# Patient Record
Sex: FEMALE | Race: WHITE | Employment: OTHER | URBAN - METROPOLITAN AREA
[De-identification: names, ages, dates, MRNs, and addresses within clinical notes are randomized per-mention and may not be internally consistent; named-entity substitution may affect disease eponyms.]

---

## 2017-05-01 RX ORDER — CLOBETASOL PROPIONATE 0.5 MG/G
CREAM TOPICAL
Qty: 45 G | Refills: 2 | Status: SHIPPED | OUTPATIENT
Start: 2017-05-01 | End: 2019-08-21 | Stop reason: SDUPTHER

## 2017-05-02 ENCOUNTER — TELEPHONE (OUTPATIENT)
Dept: DERMATOLOGY | Age: 67
End: 2017-05-02

## 2017-05-02 RX ORDER — TRIAMCINOLONE ACETONIDE 1 MG/G
CREAM TOPICAL
Qty: 80 G | Refills: 2 | Status: SHIPPED | OUTPATIENT
Start: 2017-05-02 | End: 2018-02-01 | Stop reason: SDUPTHER

## 2018-02-01 ENCOUNTER — OFFICE VISIT (OUTPATIENT)
Dept: DERMATOLOGY | Age: 68
End: 2018-02-01

## 2018-02-01 DIAGNOSIS — L57.0 AK (ACTINIC KERATOSIS): ICD-10-CM

## 2018-02-01 DIAGNOSIS — L20.84 INTRINSIC ATOPIC DERMATITIS: Primary | ICD-10-CM

## 2018-02-01 PROCEDURE — 17000 DESTRUCT PREMALG LESION: CPT | Performed by: DERMATOLOGY

## 2018-02-01 PROCEDURE — G8427 DOCREV CUR MEDS BY ELIG CLIN: HCPCS | Performed by: DERMATOLOGY

## 2018-02-01 PROCEDURE — 99213 OFFICE O/P EST LOW 20 MIN: CPT | Performed by: DERMATOLOGY

## 2018-02-01 PROCEDURE — G8421 BMI NOT CALCULATED: HCPCS | Performed by: DERMATOLOGY

## 2018-02-01 PROCEDURE — 4040F PNEUMOC VAC/ADMIN/RCVD: CPT | Performed by: DERMATOLOGY

## 2018-02-01 PROCEDURE — G8400 PT W/DXA NO RESULTS DOC: HCPCS | Performed by: DERMATOLOGY

## 2018-02-01 PROCEDURE — 3014F SCREEN MAMMO DOC REV: CPT | Performed by: DERMATOLOGY

## 2018-02-01 PROCEDURE — 1090F PRES/ABSN URINE INCON ASSESS: CPT | Performed by: DERMATOLOGY

## 2018-02-01 PROCEDURE — 96372 THER/PROPH/DIAG INJ SC/IM: CPT | Performed by: DERMATOLOGY

## 2018-02-01 PROCEDURE — 1036F TOBACCO NON-USER: CPT | Performed by: DERMATOLOGY

## 2018-02-01 PROCEDURE — G8484 FLU IMMUNIZE NO ADMIN: HCPCS | Performed by: DERMATOLOGY

## 2018-02-01 PROCEDURE — 1123F ACP DISCUSS/DSCN MKR DOCD: CPT | Performed by: DERMATOLOGY

## 2018-02-01 PROCEDURE — 3017F COLORECTAL CA SCREEN DOC REV: CPT | Performed by: DERMATOLOGY

## 2018-02-01 RX ORDER — TRIAMCINOLONE ACETONIDE 1 MG/G
CREAM TOPICAL
Qty: 454 G | Refills: 1 | Status: SHIPPED | OUTPATIENT
Start: 2018-02-01 | End: 2019-08-21 | Stop reason: SDUPTHER

## 2018-02-01 RX ORDER — TRIAMCINOLONE ACETONIDE 40 MG/ML
60 INJECTION, SUSPENSION INTRA-ARTICULAR; INTRAMUSCULAR ONCE
Status: COMPLETED | OUTPATIENT
Start: 2018-02-01 | End: 2018-02-01

## 2018-02-01 RX ADMIN — TRIAMCINOLONE ACETONIDE 60 MG: 40 INJECTION, SUSPENSION INTRA-ARTICULAR; INTRAMUSCULAR at 14:20

## 2018-02-01 NOTE — PROGRESS NOTES
risks including insomnia, mood disturbance, increased appetite. Triamcinolone 0.1% cream twice daily for up to 2 weeks or until improved. Check home for bed bugs. 2. AK (actinic keratosis) - 1    2 cycles of liquid nitrogen applied to 1 AK on the nose. Patient was educated regarding the potential risks of blister formation, discomfort, hypopigmentation, and scar. Wound care was discussed. Return in about 1 year (around 2/1/2019).

## 2019-02-04 ENCOUNTER — OFFICE VISIT (OUTPATIENT)
Dept: DERMATOLOGY | Age: 69
End: 2019-02-04
Payer: MEDICARE

## 2019-02-04 DIAGNOSIS — L20.84 INTRINSIC ATOPIC DERMATITIS: Primary | ICD-10-CM

## 2019-02-04 DIAGNOSIS — L82.1 SK (SEBORRHEIC KERATOSIS): ICD-10-CM

## 2019-02-04 DIAGNOSIS — L57.0 AK (ACTINIC KERATOSIS): ICD-10-CM

## 2019-02-04 PROCEDURE — 4040F PNEUMOC VAC/ADMIN/RCVD: CPT | Performed by: DERMATOLOGY

## 2019-02-04 PROCEDURE — 1101F PT FALLS ASSESS-DOCD LE1/YR: CPT | Performed by: DERMATOLOGY

## 2019-02-04 PROCEDURE — 17000 DESTRUCT PREMALG LESION: CPT | Performed by: DERMATOLOGY

## 2019-02-04 PROCEDURE — 1036F TOBACCO NON-USER: CPT | Performed by: DERMATOLOGY

## 2019-02-04 PROCEDURE — 99213 OFFICE O/P EST LOW 20 MIN: CPT | Performed by: DERMATOLOGY

## 2019-02-04 PROCEDURE — G8421 BMI NOT CALCULATED: HCPCS | Performed by: DERMATOLOGY

## 2019-02-04 PROCEDURE — 1090F PRES/ABSN URINE INCON ASSESS: CPT | Performed by: DERMATOLOGY

## 2019-02-04 PROCEDURE — 1123F ACP DISCUSS/DSCN MKR DOCD: CPT | Performed by: DERMATOLOGY

## 2019-02-04 PROCEDURE — G8400 PT W/DXA NO RESULTS DOC: HCPCS | Performed by: DERMATOLOGY

## 2019-02-04 PROCEDURE — 3017F COLORECTAL CA SCREEN DOC REV: CPT | Performed by: DERMATOLOGY

## 2019-02-04 PROCEDURE — G8484 FLU IMMUNIZE NO ADMIN: HCPCS | Performed by: DERMATOLOGY

## 2019-02-04 PROCEDURE — 17003 DESTRUCT PREMALG LES 2-14: CPT | Performed by: DERMATOLOGY

## 2019-02-04 PROCEDURE — G8427 DOCREV CUR MEDS BY ELIG CLIN: HCPCS | Performed by: DERMATOLOGY

## 2019-08-21 ENCOUNTER — OFFICE VISIT (OUTPATIENT)
Dept: DERMATOLOGY | Age: 69
End: 2019-08-21
Payer: MEDICARE

## 2019-08-21 DIAGNOSIS — L82.0 INFLAMED SEBORRHEIC KERATOSIS: Primary | ICD-10-CM

## 2019-08-21 PROCEDURE — 11301 SHAVE SKIN LESION 0.6-1.0 CM: CPT | Performed by: DERMATOLOGY

## 2019-08-21 RX ORDER — CLOBETASOL PROPIONATE 0.5 MG/G
CREAM TOPICAL
Qty: 60 G | Refills: 2 | Status: SHIPPED | OUTPATIENT
Start: 2019-08-21

## 2019-08-21 RX ORDER — TRIAMCINOLONE ACETONIDE 1 MG/G
CREAM TOPICAL
Qty: 454 G | Refills: 1 | Status: SHIPPED | OUTPATIENT
Start: 2019-08-21 | End: 2022-10-31 | Stop reason: SDUPTHER

## 2019-08-21 NOTE — PROGRESS NOTES
care instructions were reviewed. 1 Specimen (s) sent to pathology. The specimen bottle(s) were appropriately labeled. We also reviewed the risks of bleeding, scar, and infection.

## 2019-08-21 NOTE — PATIENT INSTRUCTIONS

## 2019-08-23 LAB — DERMATOLOGY PATHOLOGY REPORT: NORMAL

## 2020-01-21 ENCOUNTER — HOSPITAL ENCOUNTER (OUTPATIENT)
Dept: MAMMOGRAPHY | Age: 70
Discharge: HOME | End: 2020-01-21
Payer: MEDICARE

## 2020-01-21 PROCEDURE — 77067 SCR MAMMO BI INCL CAD: CPT

## 2020-06-03 ENCOUNTER — OFFICE VISIT (OUTPATIENT)
Dept: DERMATOLOGY | Age: 70
End: 2020-06-03
Payer: MEDICARE

## 2020-06-03 VITALS — TEMPERATURE: 98.5 F

## 2020-06-03 PROCEDURE — 99213 OFFICE O/P EST LOW 20 MIN: CPT | Performed by: DERMATOLOGY

## 2020-06-03 NOTE — PROGRESS NOTES
dermatitis - very mild/limited today, under good control    Continue intermittent use of triamcinolone 0.1% cream or clobetasol cream for flares. 2. SK (seborrheic keratosis)     Reassurance. 3. Adult acne - very mild    Differin 0.1% gel nightly. 4. Actinic keratosis - 1 small asymptomatic lesion    Observe. Continue sun protection. Return in about 1 year (around 6/3/2021).

## 2020-09-23 ENCOUNTER — OFFICE VISIT (OUTPATIENT)
Dept: SURGERY | Age: 70
End: 2020-09-23
Payer: MEDICARE

## 2020-09-23 VITALS
RESPIRATION RATE: 16 BRPM | OXYGEN SATURATION: 99 % | WEIGHT: 122.8 LBS | HEIGHT: 69 IN | BODY MASS INDEX: 18.19 KG/M2 | HEART RATE: 69 BPM | DIASTOLIC BLOOD PRESSURE: 72 MMHG | TEMPERATURE: 97.4 F | SYSTOLIC BLOOD PRESSURE: 110 MMHG

## 2020-09-23 PROCEDURE — 99204 OFFICE O/P NEW MOD 45 MIN: CPT | Performed by: SURGERY

## 2020-09-23 NOTE — PROGRESS NOTES
[facility]    CC:   Chief Complaint   Patient presents with    New Patient     new patient, ruptured breast implants     REFERRING PHYSICIAN: Katie Laguerre MD    HPI: This is a 77 y.o.  female who presents to clinic with ruptured bilateral breast implants. She has had them placed for over 30 years (saline implants but unsure). She underwent mammography with some concern for leakage and then underwent an MRI confirming implant rupture. She was referred for explantation of her implants. Her pertinent breast history include the following:    Last Mammogram:     Current bra size: 34B  Desired bra size: Ok being smaller. Pregnancies/miscarriages:   Breast feeding: no future plans    PMHx:   Past Medical History:   Diagnosis Date    History of hepatitis C      PSHx:   Past Surgical History:   Procedure Laterality Date    JOINT REPLACEMENT      both hips, last in 2020.  KNEE SURGERY       ALLERGIES:   Allergies   Allergen Reactions    Sulfa Antibiotics Rash     SOCIAL: No tobacco, occ ETOH, no IVD  FHx: Past history of breast CA: No   Past family members with breast reduction: No   Past family members with breast augmentation:No    Meds:   Current Outpatient Medications   Medication Sig Dispense Refill    triamcinolone (KENALOG) 0.1 % cream Apply to affected areas twice daily for up to 2 weeks or until improved for dermatitis. 454 g 1    clobetasol (TEMOVATE) 0.05 % cream APPLY TWO TIMES A DAY for 2 weeks or until improved. For dermatitis. 60 g 2    BIOTIN PO Take by mouth      Cranberry 1000 MG CAPS Take by mouth      diclofenac (VOLTAREN) 50 MG EC tablet Take 50 mg by mouth      Hyaluronic Acid-Vitamin C (HYALURONIC ACID PO) Take by mouth       No current facility-administered medications for this visit. ROS   Constitutional: Negative for chills and fever. HENT: Negative for congestion, facial swelling, and voice change.     Eyes: Negative for breast implants  PLAN:Would benefit from explantation of ruptured implants with complete capsulectomies. Additionally, would benefit from mastopexy for improved contour (though this will be cosmetic). Pricing to be provided to the patient and then will work to schedule (anticipate in November). A discussion regarding surgical options including: removal of implants, capsulectomy, and mastopexy was performed with the patient. Clinical photos were obtained. Additionally,discussion regarding the risks including, but not limited to: bleeding (potentially requiring transfusion or reoperation), infection, seroma, reoperation, poor cosmetic outcome, scarring, revisional surgery, nipple loss/complication, nipple malposition, diminished sensation, skin necrosis, inability to breastfeed, VTE (DVT/PE), implant associated ALCL, and death was performed. All questions were answered in a satisfactory manner. The patient was counseled at length about the risks of keshia Covid-19 during their perioperative period and any recovery window from their procedure. The patient was made aware that keshia Covid-19  may worsen their prognosis for recovering from their procedure  and lend to a higher morbidity and/or mortality risk. All material risks, benefits, and reasonable alternatives including postponing the procedure were discussed. The patient does wish to proceed with the procedure at this time.     Ziggy Tran MD  [facility]  09/23/20

## 2020-09-25 ENCOUNTER — TELEPHONE (OUTPATIENT)
Dept: SURGERY | Age: 70
End: 2020-09-25

## 2020-09-25 NOTE — TELEPHONE ENCOUNTER
[facility]    She needs explantation of her ruptured implants as well as complete capsulectomies. If she would like to have bilateral mastopexy at the same time, we can do that. However, the mastopexy surgery would be cosmetic. Thanks!   NK

## 2020-09-28 NOTE — TELEPHONE ENCOUNTER
Reviewed the results with patient. She did have this approved before by another plastic surgeon but, chose to go with us. She would like mastopexy pricing once we have worked on getting the approval from insurance on the ruptured implants. Please send us a surgery letter then we will work on getting this approved.

## 2020-10-02 NOTE — TELEPHONE ENCOUNTER
I spoke with Stef Almodovar at [facility] ([phone]) to see if CPT Codes 68593 and 67835 need a pre cert. She stated that no pre certification is required since it is outpatient. Call Reference # [id number]. The patient will also need cosmetic pricing when calling to schedule surgery.

## 2020-10-05 NOTE — TELEPHONE ENCOUNTER
I spoke with patient to advise that no pre cert is required for CPT Codes 52150 and 17677. I provided cosmetic pricing for the mastopexy. I will scan the pricing into Epic under the media tab. The patient thinks that she will wait to have the mastopexy done. The patient said that she and Dr. Nora Prescott discussed liposuction and would like pricing for this as well. I will work on that and call the patient back once I have it.

## 2020-10-19 ENCOUNTER — TELEPHONE (OUTPATIENT)
Dept: SURGERY | Age: 70
End: 2020-10-19

## 2020-10-27 NOTE — TELEPHONE ENCOUNTER
I lmom for patient at the home number listed. I requested a call back to discuss liposuction pricing and how to move forward. I will leave this phone note open.

## 2020-10-27 NOTE — TELEPHONE ENCOUNTER
The patient returned the call mentioned below. The patient is considering liposuction or nothing in regards to mastopexy. The patient would like to discuss her surgical options to make sure that she is clear on what her options are. Are you able to call her? Or would you prefer that she come back into the office? I may need a new surgery letter.      I will route to the MD

## 2020-10-29 NOTE — TELEPHONE ENCOUNTER
[facility]    Liposuction would not be an option for this patient. Lets have her come into the office so I can have another discussion with her to ensure that there is not any confusion.     Thanks,  NK

## 2020-11-04 ENCOUNTER — OFFICE VISIT (OUTPATIENT)
Dept: SURGERY | Age: 70
End: 2020-11-04
Payer: MEDICARE

## 2020-11-04 VITALS
TEMPERATURE: 97.3 F | BODY MASS INDEX: 18.6 KG/M2 | WEIGHT: 125.6 LBS | HEIGHT: 69 IN | HEART RATE: 58 BPM | DIASTOLIC BLOOD PRESSURE: 76 MMHG | SYSTOLIC BLOOD PRESSURE: 120 MMHG

## 2020-11-04 PROCEDURE — 99213 OFFICE O/P EST LOW 20 MIN: CPT | Performed by: SURGERY

## 2020-11-04 NOTE — PROGRESS NOTES
[facility]    CC:   No chief complaint on file. REFERRING PHYSICIAN: Hali Laguerre MD    HPI: This is a 72 y.o.  female who presents to clinic with ruptured bilateral breast implants. She has had them placed for over 30 years (saline implants but unsure). She underwent mammography with some concern for leakage and then underwent an MRI confirming implant rupture. She was referred for explantation of her implants. She has additional questions regarding surgery and presents to have all of these answered. Her pertinent breast history include the following:    Last Mammogram:     Current bra size: 34B  Desired bra size: Ok being smaller. Pregnancies/miscarriages:   Breast feeding: no future plans    PMHx:   Past Medical History:   Diagnosis Date    History of hepatitis C      PSHx:   Past Surgical History:   Procedure Laterality Date    JOINT REPLACEMENT      both hips, last in 2020.  KNEE SURGERY       ALLERGIES:   Allergies   Allergen Reactions    Sulfa Antibiotics Rash     SOCIAL: No tobacco, occ ETOH, no IVD  FHx: Past history of breast CA: No   Past family members with breast reduction: No   Past family members with breast augmentation:No    Meds:   Current Outpatient Medications   Medication Sig Dispense Refill    triamcinolone (KENALOG) 0.1 % cream Apply to affected areas twice daily for up to 2 weeks or until improved for dermatitis. 454 g 1    clobetasol (TEMOVATE) 0.05 % cream APPLY TWO TIMES A DAY for 2 weeks or until improved. For dermatitis. 60 g 2    BIOTIN PO Take by mouth      Cranberry 1000 MG CAPS Take by mouth      diclofenac (VOLTAREN) 50 MG EC tablet Take 50 mg by mouth      Hyaluronic Acid-Vitamin C (HYALURONIC ACID PO) Take by mouth       No current facility-administered medications for this visit. ROS   Constitutional: Negative for chills and fever.    HENT: Negative for congestion, facial swelling, and voice change. Eyes: Negative for photophobia and visual disturbance. Respiratory: Negative for apnea, cough, chest tightness and shortness of breath. Cardiovascular: Negative for chest pain and palpitations. Gastrointestinal: Negative for dysphagia and early satiety. Genitourinary: Negative for difficulty urinating, dysuria, flank pain, frequency and hematuria. Musculoskeletal: Negative for new gait problem, joint swelling and myalgias. Skin: Negative for color change, pallor and rash. Endocrine: negative for tremors, temperature intolerance or polydipsia. Allergic/Immunologic: Negative for new environmental or food allergies. Neurological: Negative for dizziness, seizures, speech difficulty, numbness. Hematological: Negative for adenopathy. Psychiatric/Behavioral: Negative for agitation and confusion. EXAM     There were no vitals taken for this visit.     GEN: NAD, pleasant, healthy  CVS: RRR  PULM: No respiratory distress  HEENT: PERRLA/EOMI; dentition (wearing mask), hearing appears within normal limits  NECK: Supple with trachea in midline, no masses  EXT: No lymphedema noted  ABD: soft/NT/ND   NEURO: No focal deficits, no obvious CN deficits  BACK: Bilateral latiss muscle intact  BREAST: Left larger than Right   R  Ptosis grade: Pseudoptosis     Palpable masses: Yes (baker class 3)     Nipple retraction: No     Palpable axillary lymphadenopathy: No     SN-N: 20.5 cm     N-IMF: 7 cm     Breast width: 13.1 cm     No animation deformity     Waterfall deformity      L  Ptosis stgstrstastdstest:st st1st Palpable masses: Yes (Baker class 3)     Nipple retraction: No     Palpable axillary lymphadenopathy: No     SN-N: 22.5 cm     N-IMF: 7.3 cm     Breast width: 13.2 cm     No animation deformity     Waterfall deformity    RADIOLOGY: Reviewed (including MRI at outside hospital revealing implant rupture)    IMP: 72 y.o. female with ruptured breast implants  PLAN:Would benefit from explantation of ruptured implants with complete capsulectomies. Additionally, would benefit from mastopexy for improved contour (though this will be cosmetic) - she does not want this. She is interested in fat grafting. Will obtain pricing and then provide to the patient. Anticipate surgery in 2021. A discussion regarding surgical options including: removal of implants, capsulectomy, and mastopexy was performed with the patient. Clinical photos were obtained. Additionally,discussion regarding the risks including, but not limited to: bleeding (potentially requiring transfusion or reoperation), infection, seroma, reoperation, poor cosmetic outcome, scarring, revisional surgery, nipple loss/complication, nipple malposition, diminished sensation, skin necrosis, inability to breastfeed, VTE (DVT/PE), implant associated ALCL, and death was performed. All questions were answered in a satisfactory manner. The patient was counseled at length about the risks of keshia Covid-19 during their perioperative period and any recovery window from their procedure. The patient was made aware that keshia Covid-19  may worsen their prognosis for recovering from their procedure  and lend to a higher morbidity and/or mortality risk. All material risks, benefits, and reasonable alternatives including postponing the procedure were discussed. The patient does wish to proceed with the procedure at this time.     Dorcas De Leon MD  [facility]  11/04/20

## 2020-11-12 ENCOUNTER — TELEPHONE (OUTPATIENT)
Dept: SURGERY | Age: 70
End: 2020-11-12

## 2020-11-12 NOTE — TELEPHONE ENCOUNTER
The patient was in the office to see Dr. Braden Hollis on 11-4-2020. IMP: 72 y.o. female with ruptured breast implants  PLAN:Would benefit from explantation of ruptured implants with complete capsulectomies. Additionally, would benefit from mastopexy for improved contour (though this will be cosmetic) - she does not want this. She is interested in fat grafting. Will obtain pricing and then provide to the patient. Anticipate surgery in 2021.     Please provide a surgery letter. I will then submit to insurance and call patient with cosmetic pricing.     I will route to MD.

## 2020-11-13 NOTE — TELEPHONE ENCOUNTER
I spoke with Lawyer Reyna at [facility] ([phone]) to see if CPT Code 71055 requires a pre authorization. Authorization Is Not Required   Call Reference # [id number]    Cosmetic Pricing   Fat Grafting To The Breasts $3850.00 The patient is aware that payment is due to in full 14 days prior to surgery    The following is a message from Dr Tamika Dutton listed on the surgery letter :    I need to talk to her about fat grafting before we do any scheduling. Her BMI is 18, so I'm not sure how much fat I will truly be able to get out of her. Thanks! NK       I spoke with the patient to discuss surgery and cosmetic pricing. She is aware that Dr. Tamika Dutton would yue to speak to her prior to scheduling. She would like Dr. Tamika Dutton to call her, so she can decide how to move forward. Please call the patient.      I will route to MD.

## 2020-11-15 NOTE — TELEPHONE ENCOUNTER
[facility]    Spoke with Ellen Whitten on the phone. She would like to proceed with removal of her implants now and would like to schedule. She will revisit future cosmetic procedures (eg mastopexy, secondary augmentation, fat grafting etc) after she has completely recovered. Thanks!   Ken Davis

## 2020-11-17 NOTE — TELEPHONE ENCOUNTER
I lmom for patient at the home number listed. I requested a call back to discuss scheduling surgery. I will leave this phone note open.

## 2020-11-19 NOTE — TELEPHONE ENCOUNTER
I returned the call mentioned below. The patient is now scheduled to have surgery with Dr. Marlene Roach 1-. She is not doing the cosmetic portion that was previously discussed, so I changed the surgery letter. The patient is aware of H&P and COVID. I will mail the surgery information and instructions to the patient. I will submit the surgery letter. The patient stated that she has had both replaced and would like to know if she needs a pre op antibiotic?     I will route this question to the MD.

## 2020-12-03 ENCOUNTER — TELEPHONE (OUTPATIENT)
Dept: SURGERY | Age: 70
End: 2020-12-03

## 2020-12-03 NOTE — TELEPHONE ENCOUNTER
Pt called. She was seen as a new pt on 09/23/2020 and her questions were not answered. She was scheduled for another appt on 11/04/2020 to answer her questions. Pt stated she asked if she would have to pay another copay and was told no since her questions were not answered at initial visit. She has now received a bill for a copay for 11/04/2020 visit. Please call.

## 2021-01-15 NOTE — PROGRESS NOTES
Patient has history of + covid test 2020 , done at [facility], I asked her for copy of results with her name, , and date of testing to be faxed/emailed to us.  email address given to patient and informed if we receive this no need for covid testing on 2021, patient acknowledges understanding.      Received covid + test results via email from patient done 2020 through [facility], per [facility], scanned into system, schedulers informed, no need to retest.

## 2021-01-15 NOTE — PROGRESS NOTES
Message left at office of Gerarda Klinefelter CNP for EKG tracing done 1/12/2021 to be  faxed to us/also left our office number.

## 2021-01-15 NOTE — PROGRESS NOTES
[facility] patients having surgery or anesthesia are required to be Covid tested. You will need to quarantined from the time you are tested until your surgery. PRIOR TO PROCEDURE DATE:  1. Please follow any guidelines/instructions prior to your procedure as advised by your surgeon. 2. Arrange for someone to drive you home and be with you for the first 24 hours after discharge for your safety after your procedure for which you received sedation. Ensure it is someone we can share information with regarding your discharge. 3. You must contact your surgeon for instructions IF:  ? You are taking any blood thinners, aspirin, anti-inflammatory or vitamin E.  ? There is a change in your physical condition such as a cold, fever, rash, cuts, sores or any other infection, especially near your surgical site. 4. Do not drink alcohol the day before or day of your procedure. 5. A Pre-op History and Physical for surgery MUST be completed by your Physician or Urgent Care within 30 days of your procedure date. Please bring a copy with you on the day of your procedure and along with any other testing performed. THE DAY OF YOUR PROCEDURE:  1. Follow instructions for ARRIVAL TIME as DIRECTED BY YOUR SURGEON. I    2. Enter the MAIN entrance from [address] [place] [address] and follow the signs to the free [employer] or [employer] parking (offered free of charge 6am-5pm). 3. Enter the Main Entrance of the hospital (do not enter from the lower level of the parking garage). Upon entrance, check in with the  at the main desk on your left. If no one is available at the desk, proceed into the [facility] Waiting Room and go through the door directly into the [facility]. There is a Check-in desk ACROSS from Room 5 (marked with a sign hanging from the ceiling). The phone number for the surgery center is [phone]. 4. Please call [phone] option #2 option #2 if you have not been preregistered yet. On the day of your procedure bring your insurance card and photo ID. You will be registered at your bedside once brought back to your room. 5. DO NOT EAT ANYTHING eight hours prior to surgery. May have 8 ounces of water 4 hours prior to surgery. 6. MEDICATIONS   ? Take the following medications with a SMALL sip of water: none  ? Use your usual dose of inhalers the morning of surgery. BRING your rescue inhaler with you to hospital.   ? Anesthesia does NOT want you to take insulin the morning of surgery. They will control your blood sugar while you are at the hospital. Please contact your ordering physician for instructions regarding your insulin the night before your procedure. If you have an insulin pump, please keep it set on basal rate. 7. Do not swallow water when brushing teeth. No gum, candy, mints or ice chips. Refrain from smoking or at least decrease the amount. 8. Dress in loose, comfortable clothing appropriate for redressing after your procedure. Do not wear jewelry (including body piercings), make-up (especially NO eye make-up), fingernail polish (NO toenail polish if foot/leg surgery), lotion, powders or metal hairclips. 9. Dentures, glasses, or contacts will need to be removed before your procedure. Bring cases for your glasses, contacts, dentures, or hearing aids to protect them while you are in surgery. 10. If you use a CPAP, please bring it with you on the day of your procedure. 11. We recommend that valuable personal  belongings such as cash, cell phones, e-tablets or jewelry, be left at home during your stay. The hospital will not be responsible for valuables that are not secured in the hospital safe. However, if your insurance requires a co-pay, you may want to bring a method of payment, i.e. Check or credit card, if you wish to pay your co-pay the day of surgery. 12. If you are to stay overnight, you may bring a bag with personal items. Please have any large items you may need brought in by your family after your arrival to your hospital room. 15. If you have a Living Will or Durable Power of , please bring a copy on the day of your procedure. 15. With your permission, one family member may accompany you while you are being prepared for surgery. Once you are ready, additional family members may join you. HOW WE KEEP YOU SAFE and WORK TO PREVENT SURGICAL SITE INFECTIONS:  1. Health care workers should always check your ID bracelet to verify your name and birth date. You will be asked many times to state your name, date of birth, and allergies. 2. Health care workers should always clean their hands with soap or alcohol gel before providing care to you. It is okay to ask anyone if they cleaned their hands before they touch you. 3. You will be actively involved in verifying the type of procedure you are having and ensuring the correct surgical site. This will be confirmed multiple times prior to your procedure. Do NOT tracie your surgery site UNLESS instructed to by your surgeon. 4. Do not shave or wax for 72 hours prior to procedure near your operative site. Shaving with a razor can irritate your skin and make it easier to develop an infection. On the day of your procedure, any hair that needs to be removed near the surgical site will be clipped by a healthcare worker using a special clippers designed to avoid skin irritation. 5. When you are in the operating room, your surgical site will be cleansed with a special soap, and in most cases, you will be given an antibiotic before the surgery begins.       What to expect AFTER YOUR PROCEDURE: 1. Immediately following your procedure, your will be taken to the PACU for the first phase of your recovery. Your nurse will help you recover from any potential side effects of anesthesia, such as extreme drowsiness, changes in your vital signs or breathing patterns. Nausea, headache, muscle aches, or sore throat may also occur after anesthesia. Your nurse will help you manage these potential side effects. 2. For comfort and safety, arrange to have someone at home with you for the first 24 hours after discharge. 3. You and your family will be given written instructions about your diet, activity, dressing care, medications, and return visits. 4. Once at home, should issues with nausea, pain, or bleeding occur, or should you notice any signs of infection, you should call your surgeon. 5. Always clean your hands before and after caring for your wound. Do not let your family touch your surgery site without cleaning their hands. 6. Narcotic pain medications can cause significant constipation. You may want to add a stool softener to your postoperative medication schedule or speak to your surgeon on how best to manage this SIDE EFFECT. SPECIAL INSTRUCTIONS reviewed restricted visitation that is in place as of today with patient, please email/fax past + covid results to us with name//date of test, patient acknowledges understanding of pre op instructions. Thank you for allowing us to care for you. We strive to exceed your expectations in the delivery of care and service provided to you and your family. If you need to contact us for any reason, please call us at [phone]    Instructions reviewed with patient during preadmission testing phone interview. Madalyn Mack. 1/15/2021 .2:37 PM      ADDITIONAL EDUCATIONAL INFORMATION REVIEWED PER PHONE WITH YOU AND/OR YOUR FAMILY:  No Bring a urine sample on day of surgery

## 2021-01-21 ENCOUNTER — ANESTHESIA EVENT (OUTPATIENT)
Dept: OPERATING ROOM | Age: 71
End: 2021-01-21
Payer: MEDICARE

## 2021-01-22 ENCOUNTER — ANESTHESIA (OUTPATIENT)
Dept: OPERATING ROOM | Age: 71
End: 2021-01-22
Payer: MEDICARE

## 2021-01-22 ENCOUNTER — APPOINTMENT (OUTPATIENT)
Dept: GENERAL RADIOLOGY | Age: 71
End: 2021-01-22
Attending: SURGERY
Payer: MEDICARE

## 2021-01-22 ENCOUNTER — HOSPITAL ENCOUNTER (OUTPATIENT)
Age: 71
Setting detail: OUTPATIENT SURGERY
Discharge: HOME | End: 2021-01-22
Attending: SURGERY | Admitting: SURGERY
Payer: MEDICARE

## 2021-01-22 VITALS
BODY MASS INDEX: 18.22 KG/M2 | HEIGHT: 69 IN | WEIGHT: 123 LBS | DIASTOLIC BLOOD PRESSURE: 70 MMHG | SYSTOLIC BLOOD PRESSURE: 108 MMHG | RESPIRATION RATE: 12 BRPM | HEART RATE: 77 BPM | TEMPERATURE: 96.7 F | OXYGEN SATURATION: 96 %

## 2021-01-22 VITALS — OXYGEN SATURATION: 98 % | DIASTOLIC BLOOD PRESSURE: 52 MMHG | TEMPERATURE: 96.4 F | SYSTOLIC BLOOD PRESSURE: 83 MMHG

## 2021-01-22 DIAGNOSIS — T85.43XA BREAST IMPLANT RUPTURE, INITIAL ENCOUNTER: ICD-10-CM

## 2021-01-22 PROCEDURE — 6360000002 HC RX W HCPCS: Performed by: SURGERY

## 2021-01-22 PROCEDURE — 88305 TISSUE EXAM BY PATHOLOGIST: CPT

## 2021-01-22 PROCEDURE — 2580000003 HC RX 258: Performed by: ANESTHESIOLOGY

## 2021-01-22 PROCEDURE — 71045 X-RAY EXAM CHEST 1 VIEW: CPT

## 2021-01-22 PROCEDURE — 88341 IMHCHEM/IMCYTCHM EA ADD ANTB: CPT

## 2021-01-22 PROCEDURE — 2500000003 HC RX 250 WO HCPCS: Performed by: NURSE ANESTHETIST, CERTIFIED REGISTERED

## 2021-01-22 PROCEDURE — 19371 PERI-IMPLT CAPSLC BRST COMPL: CPT | Performed by: SURGERY

## 2021-01-22 PROCEDURE — 3600000012 HC SURGERY LEVEL 2 ADDTL 15MIN: Performed by: SURGERY

## 2021-01-22 PROCEDURE — 2580000003 HC RX 258: Performed by: NURSE ANESTHETIST, CERTIFIED REGISTERED

## 2021-01-22 PROCEDURE — 7100000010 HC PHASE II RECOVERY - FIRST 15 MIN: Performed by: SURGERY

## 2021-01-22 PROCEDURE — 2709999900 HC NON-CHARGEABLE SUPPLY: Performed by: SURGERY

## 2021-01-22 PROCEDURE — 3700000000 HC ANESTHESIA ATTENDED CARE: Performed by: SURGERY

## 2021-01-22 PROCEDURE — 7100000001 HC PACU RECOVERY - ADDTL 15 MIN: Performed by: SURGERY

## 2021-01-22 PROCEDURE — 3600000002 HC SURGERY LEVEL 2 BASE: Performed by: SURGERY

## 2021-01-22 PROCEDURE — 3700000001 HC ADD 15 MINUTES (ANESTHESIA): Performed by: SURGERY

## 2021-01-22 PROCEDURE — 7100000011 HC PHASE II RECOVERY - ADDTL 15 MIN: Performed by: SURGERY

## 2021-01-22 PROCEDURE — 2500000003 HC RX 250 WO HCPCS: Performed by: SURGERY

## 2021-01-22 PROCEDURE — 2580000003 HC RX 258: Performed by: SURGERY

## 2021-01-22 PROCEDURE — 7100000000 HC PACU RECOVERY - FIRST 15 MIN: Performed by: SURGERY

## 2021-01-22 PROCEDURE — 2720000010 HC SURG SUPPLY STERILE: Performed by: SURGERY

## 2021-01-22 PROCEDURE — 6370000000 HC RX 637 (ALT 250 FOR IP): Performed by: SURGERY

## 2021-01-22 PROCEDURE — 6370000000 HC RX 637 (ALT 250 FOR IP): Performed by: ANESTHESIOLOGY

## 2021-01-22 PROCEDURE — 88342 IMHCHEM/IMCYTCHM 1ST ANTB: CPT

## 2021-01-22 PROCEDURE — 6360000002 HC RX W HCPCS: Performed by: NURSE ANESTHETIST, CERTIFIED REGISTERED

## 2021-01-22 PROCEDURE — 6360000002 HC RX W HCPCS: Performed by: ANESTHESIOLOGY

## 2021-01-22 PROCEDURE — 19330 RMVL RUPTURED BREAST IMPLANT: CPT | Performed by: SURGERY

## 2021-01-22 RX ORDER — SODIUM CHLORIDE 0.9 % (FLUSH) 0.9 %
10 SYRINGE (ML) INJECTION PRN
Status: DISCONTINUED | OUTPATIENT
Start: 2021-01-22 | End: 2021-01-22 | Stop reason: HOSPADM

## 2021-01-22 RX ORDER — ONDANSETRON 2 MG/ML
4 INJECTION INTRAMUSCULAR; INTRAVENOUS
Status: COMPLETED | OUTPATIENT
Start: 2021-01-22 | End: 2021-01-22

## 2021-01-22 RX ORDER — 0.9 % SODIUM CHLORIDE 0.9 %
500 INTRAVENOUS SOLUTION INTRAVENOUS
Status: DISCONTINUED | OUTPATIENT
Start: 2021-01-22 | End: 2021-01-22 | Stop reason: HOSPADM

## 2021-01-22 RX ORDER — HYDRALAZINE HYDROCHLORIDE 20 MG/ML
5 INJECTION INTRAMUSCULAR; INTRAVENOUS EVERY 10 MIN PRN
Status: DISCONTINUED | OUTPATIENT
Start: 2021-01-22 | End: 2021-01-22 | Stop reason: HOSPADM

## 2021-01-22 RX ORDER — LIDOCAINE HYDROCHLORIDE 20 MG/ML
INJECTION, SOLUTION INFILTRATION; PERINEURAL PRN
Status: DISCONTINUED | OUTPATIENT
Start: 2021-01-22 | End: 2021-01-22 | Stop reason: SDUPTHER

## 2021-01-22 RX ORDER — SODIUM CHLORIDE 9 MG/ML
INJECTION, SOLUTION INTRAVENOUS CONTINUOUS
Status: DISCONTINUED | OUTPATIENT
Start: 2021-01-22 | End: 2021-01-22 | Stop reason: HOSPADM

## 2021-01-22 RX ORDER — SODIUM CHLORIDE 0.9 % (FLUSH) 0.9 %
10 SYRINGE (ML) INJECTION EVERY 12 HOURS SCHEDULED
Status: DISCONTINUED | OUTPATIENT
Start: 2021-01-22 | End: 2021-01-22 | Stop reason: HOSPADM

## 2021-01-22 RX ORDER — HYDROCODONE BITARTRATE AND ACETAMINOPHEN 5; 325 MG/1; MG/1
1 TABLET ORAL
Status: COMPLETED | OUTPATIENT
Start: 2021-01-22 | End: 2021-01-22

## 2021-01-22 RX ORDER — MEPERIDINE HYDROCHLORIDE 25 MG/ML
12.5 INJECTION INTRAMUSCULAR; INTRAVENOUS; SUBCUTANEOUS EVERY 5 MIN PRN
Status: DISCONTINUED | OUTPATIENT
Start: 2021-01-22 | End: 2021-01-22 | Stop reason: HOSPADM

## 2021-01-22 RX ORDER — OXYCODONE HYDROCHLORIDE AND ACETAMINOPHEN 5; 325 MG/1; MG/1
1 TABLET ORAL EVERY 6 HOURS PRN
Qty: 28 TABLET | Refills: 0 | Status: SHIPPED | OUTPATIENT
Start: 2021-01-22 | End: 2021-01-29

## 2021-01-22 RX ORDER — GLYCOPYRROLATE 0.2 MG/ML
INJECTION INTRAMUSCULAR; INTRAVENOUS PRN
Status: DISCONTINUED | OUTPATIENT
Start: 2021-01-22 | End: 2021-01-22 | Stop reason: SDUPTHER

## 2021-01-22 RX ORDER — HYDROMORPHONE HCL 110MG/55ML
PATIENT CONTROLLED ANALGESIA SYRINGE INTRAVENOUS PRN
Status: DISCONTINUED | OUTPATIENT
Start: 2021-01-22 | End: 2021-01-22 | Stop reason: SDUPTHER

## 2021-01-22 RX ORDER — ROCURONIUM BROMIDE 10 MG/ML
INJECTION, SOLUTION INTRAVENOUS PRN
Status: DISCONTINUED | OUTPATIENT
Start: 2021-01-22 | End: 2021-01-22 | Stop reason: SDUPTHER

## 2021-01-22 RX ORDER — PROCHLORPERAZINE EDISYLATE 5 MG/ML
5 INJECTION INTRAMUSCULAR; INTRAVENOUS
Status: COMPLETED | OUTPATIENT
Start: 2021-01-22 | End: 2021-01-22

## 2021-01-22 RX ORDER — MAGNESIUM HYDROXIDE 1200 MG/15ML
LIQUID ORAL CONTINUOUS PRN
Status: COMPLETED | OUTPATIENT
Start: 2021-01-22 | End: 2021-01-22

## 2021-01-22 RX ORDER — PROPOFOL 10 MG/ML
INJECTION, EMULSION INTRAVENOUS PRN
Status: DISCONTINUED | OUTPATIENT
Start: 2021-01-22 | End: 2021-01-22 | Stop reason: SDUPTHER

## 2021-01-22 RX ORDER — SODIUM CHLORIDE, SODIUM LACTATE, POTASSIUM CHLORIDE, CALCIUM CHLORIDE 600; 310; 30; 20 MG/100ML; MG/100ML; MG/100ML; MG/100ML
INJECTION, SOLUTION INTRAVENOUS CONTINUOUS PRN
Status: DISCONTINUED | OUTPATIENT
Start: 2021-01-22 | End: 2021-01-22 | Stop reason: SDUPTHER

## 2021-01-22 RX ORDER — CEFAZOLIN SODIUM 2 G/50ML
2 SOLUTION INTRAVENOUS ONCE
Status: COMPLETED | OUTPATIENT
Start: 2021-01-22 | End: 2021-01-22

## 2021-01-22 RX ORDER — SODIUM CHLORIDE, SODIUM LACTATE, POTASSIUM CHLORIDE, CALCIUM CHLORIDE 600; 310; 30; 20 MG/100ML; MG/100ML; MG/100ML; MG/100ML
INJECTION, SOLUTION INTRAVENOUS CONTINUOUS
Status: DISCONTINUED | OUTPATIENT
Start: 2021-01-22 | End: 2021-01-22 | Stop reason: HOSPADM

## 2021-01-22 RX ORDER — ONDANSETRON 2 MG/ML
INJECTION INTRAMUSCULAR; INTRAVENOUS PRN
Status: DISCONTINUED | OUTPATIENT
Start: 2021-01-22 | End: 2021-01-22 | Stop reason: SDUPTHER

## 2021-01-22 RX ORDER — DIPHENHYDRAMINE HYDROCHLORIDE 50 MG/ML
12.5 INJECTION INTRAMUSCULAR; INTRAVENOUS
Status: DISCONTINUED | OUTPATIENT
Start: 2021-01-22 | End: 2021-01-22 | Stop reason: HOSPADM

## 2021-01-22 RX ADMIN — ONDANSETRON 4 MG: 2 INJECTION INTRAMUSCULAR; INTRAVENOUS at 10:19

## 2021-01-22 RX ADMIN — PHENYLEPHRINE HYDROCHLORIDE 80 MCG: 10 INJECTION, SOLUTION INTRAMUSCULAR; INTRAVENOUS; SUBCUTANEOUS at 10:33

## 2021-01-22 RX ADMIN — ROCURONIUM BROMIDE 15 MG: 10 INJECTION INTRAVENOUS at 11:39

## 2021-01-22 RX ADMIN — GLYCOPYRROLATE 0.2 MG: 0.2 INJECTION INTRAMUSCULAR; INTRAVENOUS at 10:33

## 2021-01-22 RX ADMIN — TRANEXAMIC ACID 1000 MG: 1 INJECTION, SOLUTION INTRAVENOUS at 10:53

## 2021-01-22 RX ADMIN — LIDOCAINE HYDROCHLORIDE 60 MG: 20 INJECTION, SOLUTION INFILTRATION; PERINEURAL at 10:21

## 2021-01-22 RX ADMIN — NEOSTIGMINE METHYLSULFATE 3 MG: 1 INJECTION INTRAMUSCULAR; INTRAVENOUS; SUBCUTANEOUS at 12:06

## 2021-01-22 RX ADMIN — GLYCOPYRROLATE 0.6 MG: 0.2 INJECTION INTRAMUSCULAR; INTRAVENOUS at 12:06

## 2021-01-22 RX ADMIN — CEFAZOLIN SODIUM 2 G: 2 SOLUTION INTRAVENOUS at 10:29

## 2021-01-22 RX ADMIN — HYDROMORPHONE HYDROCHLORIDE 0.5 MG: 1 INJECTION, SOLUTION INTRAMUSCULAR; INTRAVENOUS; SUBCUTANEOUS at 12:36

## 2021-01-22 RX ADMIN — PHENYLEPHRINE HYDROCHLORIDE 50 MCG: 10 INJECTION, SOLUTION INTRAMUSCULAR; INTRAVENOUS; SUBCUTANEOUS at 11:29

## 2021-01-22 RX ADMIN — PROCHLORPERAZINE EDISYLATE 5 MG: 5 INJECTION INTRAMUSCULAR; INTRAVENOUS at 13:20

## 2021-01-22 RX ADMIN — SODIUM CHLORIDE, SODIUM LACTATE, POTASSIUM CHLORIDE, AND CALCIUM CHLORIDE: .6; .31; .03; .02 INJECTION, SOLUTION INTRAVENOUS at 10:01

## 2021-01-22 RX ADMIN — PROPOFOL 50 MG: 10 INJECTION, EMULSION INTRAVENOUS at 10:24

## 2021-01-22 RX ADMIN — HYDROMORPHONE HYDROCHLORIDE 1 MG: 2 INJECTION, SOLUTION INTRAMUSCULAR; INTRAVENOUS; SUBCUTANEOUS at 10:21

## 2021-01-22 RX ADMIN — PROPOFOL 70 MG: 10 INJECTION, EMULSION INTRAVENOUS at 12:05

## 2021-01-22 RX ADMIN — FAMOTIDINE 20 MG: 10 INJECTION, SOLUTION INTRAVENOUS at 10:19

## 2021-01-22 RX ADMIN — PHENYLEPHRINE HYDROCHLORIDE 50 MCG: 10 INJECTION, SOLUTION INTRAMUSCULAR; INTRAVENOUS; SUBCUTANEOUS at 11:13

## 2021-01-22 RX ADMIN — HYDROCODONE BITARTRATE AND ACETAMINOPHEN 1 TABLET: 5; 325 TABLET ORAL at 14:48

## 2021-01-22 RX ADMIN — PROPOFOL 100 MG: 10 INJECTION, EMULSION INTRAVENOUS at 10:21

## 2021-01-22 RX ADMIN — ONDANSETRON 4 MG: 2 INJECTION INTRAMUSCULAR; INTRAVENOUS at 13:17

## 2021-01-22 RX ADMIN — ROCURONIUM BROMIDE 50 MG: 10 INJECTION INTRAVENOUS at 10:21

## 2021-01-22 RX ADMIN — SODIUM CHLORIDE, POTASSIUM CHLORIDE, SODIUM LACTATE AND CALCIUM CHLORIDE: 600; 310; 30; 20 INJECTION, SOLUTION INTRAVENOUS at 09:40

## 2021-01-22 ASSESSMENT — PULMONARY FUNCTION TESTS
PIF_VALUE: 14
PIF_VALUE: 16
PIF_VALUE: 16
PIF_VALUE: 2
PIF_VALUE: 17
PIF_VALUE: 18
PIF_VALUE: 15
PIF_VALUE: 14
PIF_VALUE: 17
PIF_VALUE: 21
PIF_VALUE: 0
PIF_VALUE: 15
PIF_VALUE: 17
PIF_VALUE: 15
PIF_VALUE: 14
PIF_VALUE: 16
PIF_VALUE: 16
PIF_VALUE: 14
PIF_VALUE: 17
PIF_VALUE: 15
PIF_VALUE: 15
PIF_VALUE: 16
PIF_VALUE: 15
PIF_VALUE: 18
PIF_VALUE: 15
PIF_VALUE: 15
PIF_VALUE: 16
PIF_VALUE: 17
PIF_VALUE: 15
PIF_VALUE: 2
PIF_VALUE: 15
PIF_VALUE: 16
PIF_VALUE: 15
PIF_VALUE: 16
PIF_VALUE: 2
PIF_VALUE: 15
PIF_VALUE: 14
PIF_VALUE: 15
PIF_VALUE: 16
PIF_VALUE: 19
PIF_VALUE: 16
PIF_VALUE: 15
PIF_VALUE: 16
PIF_VALUE: 16
PIF_VALUE: 15
PIF_VALUE: 0
PIF_VALUE: 16
PIF_VALUE: 15
PIF_VALUE: 2
PIF_VALUE: 17
PIF_VALUE: 15
PIF_VALUE: 14
PIF_VALUE: 16
PIF_VALUE: 2
PIF_VALUE: 16
PIF_VALUE: 15
PIF_VALUE: 15
PIF_VALUE: 30
PIF_VALUE: 16
PIF_VALUE: 15
PIF_VALUE: 17
PIF_VALUE: 17
PIF_VALUE: 16
PIF_VALUE: 2

## 2021-01-22 ASSESSMENT — PAIN SCALES - GENERAL
PAINLEVEL_OUTOF10: 6
PAINLEVEL_OUTOF10: 4
PAINLEVEL_OUTOF10: 6
PAINLEVEL_OUTOF10: 5
PAINLEVEL_OUTOF10: 5

## 2021-01-22 ASSESSMENT — PAIN DESCRIPTION - PAIN TYPE
TYPE: SURGICAL PAIN
TYPE: SURGICAL PAIN

## 2021-01-22 ASSESSMENT — LIFESTYLE VARIABLES: SMOKING_STATUS: 0

## 2021-01-22 ASSESSMENT — PAIN DESCRIPTION - DESCRIPTORS
DESCRIPTORS: DISCOMFORT
DESCRIPTORS: BURNING;DISCOMFORT
DESCRIPTORS: DISCOMFORT

## 2021-01-22 ASSESSMENT — PAIN DESCRIPTION - FREQUENCY
FREQUENCY: INTERMITTENT
FREQUENCY: CONTINUOUS
FREQUENCY: CONTINUOUS

## 2021-01-22 ASSESSMENT — PAIN DESCRIPTION - LOCATION
LOCATION: CHEST
LOCATION: CHEST

## 2021-01-22 NOTE — PROGRESS NOTES
Ambulatory Surgery/Procedure Discharge Note    Vitals:    01/22/21 1434   BP: 108/70   Pulse: 77   Resp: 12   Temp: 96.7 °F (35.9 °C)   SpO2: 96%     Patient arrived in Phase II recovery alert and oriented. VSS. Complains of 6 out of 10 pain. Administered pain medication as prescribed. Patient reports decrease in pain. Surgical incisions covered with dressing and surgical bra. Bilateral bulb suction drains attached and draining bloody discharge. Emptied drains while teaching patient how to care for drains at home. Discharge instructions reviewed with patient and friend. Both verbalized understanding. In: -   Out: 40 [Drains:40]    Restroom use offered before discharge. Yes    Pain assessment:  present - adequately treated  Pain Level: 6        Patient discharged to home/self care.  Patient discharged via wheel chair home with friend.      1/22/2021 3:45 PM

## 2021-01-22 NOTE — ANESTHESIA PRE PROCEDURE
 HYDROcodone-acetaminophen (NORCO) 5-325 MG per tablet 1 tablet  1 tablet Oral Once PRN Babara Shaper, DO        [facility] injection 4 mg  4 mg Intravenous Once PRN Babara Shaper, DO        prochlorperazine (COMPAZINE) injection 5 mg  5 mg Intravenous Once PRN Babara Shaper, DO        0.9 % sodium chloride bolus  500 mL Intravenous Once PRN Babara Shaper, DO        diphenhydrAMINE (BENADRYL) injection 12.5 mg  12.5 mg Intravenous Once PRN Babara Shaper, DO        hydrALAZINE (APRESOLINE) injection 5 mg  5 mg Intravenous Q10 Min PRN Babara Shaper, DO           Allergies: Allergies   Allergen Reactions    Sulfa Antibiotics Rash       Problem List:  There is no problem list on file for this patient. Past Medical History:        Diagnosis Date    History of hepatitis C        Past Surgical History:        Procedure Laterality Date    JOINT REPLACEMENT      both hips, last in Feb 2020.  KNEE SURGERY Right     meniscus repair       Social History:    Social History     Tobacco Use    Smoking status: Never Smoker    Smokeless tobacco: Never Used   Substance Use Topics    Alcohol use:  Yes     Alcohol/week: 1.0 standard drinks     Types: 1 Glasses of wine per week     Comment: occassionally                                 Counseling given: Not Answered      Vital Signs (Current):   Vitals:    01/15/21 1412 01/22/21 0858   BP:  124/75   Pulse:  78   Resp:  16   Temp:  98 °F (36.7 °C)   TempSrc:  Oral   SpO2:  95%   Weight: 123 lb (55.8 kg) 123 lb (55.8 kg)   Height: 5' 9\" (1.753 m) 5' 9\" (1.753 m)                                              BP Readings from Last 3 Encounters:   01/22/21 124/75   11/04/20 120/76   09/23/20 110/72       NPO Status: Time of last liquid consumption: 1900                        Time of last solid consumption: 1900                        Date of last liquid consumption: 01/21/21 NYHA Classification: I  ECG reviewed  Rhythm: regular  Rate: normal           Beta Blocker:  Not on Beta Blocker         Neuro/Psych:               GI/Hepatic/Renal:   (+) hepatitis (18 yrs ago:  tx with interferon   hx ivdu as teen ): C,           Endo/Other:                      ROS comment: Had COVID 12/1/20  Resolved  Abdominal:           Vascular:                                        Anesthesia Plan      general     ASA 2       Induction: intravenous. MIPS: Prophylactic antiemetics administered. Anesthetic plan and risks discussed with patient. Plan discussed with CRNA.     Attending anesthesiologist reviewed and agrees with DO Marcela   1/22/2021

## 2021-01-22 NOTE — PROGRESS NOTES
PACU Transfer to [facility]  Pt's Current Allergies: Sulfa antibiotics    Pt meets criteria to transfer to next phase of care per YANET SCORE and ASPAN standards    No results for input(s): POCGLU in the last 72 hours. Vitals:    01/22/21 1415   BP: 98/66   Pulse: 88   Resp: 14   Temp: 98.3 °F (36.8 °C)   SpO2: 94%      BP within 20% of pt's admitting BP as per Yanet Score      Intake/Output Summary (Last 24 hours) at 1/22/2021 1433  Last data filed at 1/22/2021 1415  Gross per 24 hour   Intake 1360 ml   Output    Net 1360 ml         Pain assessment:  present - adequately treated-denied need for pain medication-reported tolerable  Pain Level: 5    Patient was assessed for unknown alterations to skin integrity. There were not unknown alterations observed. Patient transferred to care of Harpal Wong RN.   Friend called again and updated     1/22/2021 2:33 PM

## 2021-01-22 NOTE — ANESTHESIA POSTPROCEDURE EVALUATION
Department of Anesthesiology  Postprocedure Note    Patient: Anirudh   MRN: [id number]  YOB: 1950  Date of evaluation: 1/22/2021  Time:  2:30 PM     Procedure Summary     Date: 01/22/21 Room / Location: [address] [place] [address] [address]    Anesthesia Start: 3588 Anesthesia Stop: 1224    Procedure: EXPLANTATION OF BILATERAL BREAST IMPLANTS, BILATERAL COMPLETE CAPSULECTOMIES (Bilateral ) Diagnosis:       Breast implant rupture, initial encounter      (Breast implant rupture, initial encounter [H01.86JD])    Surgeons: Selvin Herrmann MD Responsible Provider: José Miguel Rosas DO    Anesthesia Type: general ASA Status: 2          Anesthesia Type: general    Lee Phase I: Lee Score: 10    Lee Phase II:      Last vitals: Reviewed and per EMR flowsheets.        Anesthesia Post Evaluation    Patient location during evaluation: PACU  Patient participation: complete - patient participated  Level of consciousness: awake and alert  Pain score: 2  Airway patency: patent  Nausea & Vomiting: no nausea and no vomiting  Complications: no  Cardiovascular status: hemodynamically stable  Respiratory status: acceptable  Hydration status: stable

## 2021-01-22 NOTE — BRIEF OP NOTE
Brief Postoperative Note      Patient: Ahsan Lopez  YOB: 1950  MRN: [id number]    Date of Procedure: 1/22/2021    Pre-Op Diagnosis: Breast implant rupture, initial encounter [T85.43XA]    Post-Op Diagnosis: Same       Procedure(s):  EXPLANTATION OF BILATERAL BREAST IMPLANTS, BILATERAL COMPLETE CAPSULECTOMIES    Surgeon(s):  Dalton Thurston MD    Assistant:  Surgical Assistant: Tl Castellanos  Resident: Lyn Moss MD    Anesthesia: General    Estimated Blood Loss (mL): less than 50    Complications: None    Specimens:   ID Type Source Tests Collected by Time Destination   A : RIGHT- RUPTURED IMPLANT WITH CAPSULE  Tissue Tissue SURGICAL PATHOLOGY Dalton Thurston MD 1/22/2021 1114    B : LEFT- RUPTURED IMPLANT WITH CAPSULE Tissue Tissue SURGICAL PATHOLOGY Dalton Thurston MD 1/22/2021 1140        Implants:  * No implants in log *      Drains:   Closed/Suction Drain Inferior; Lateral;Right Breast Bulb [address] (Active)       Closed/Suction Drain Inferior; Lateral;Left Breast [address] (Active)       Findings: Rupture right implant, left implant, both removed    Electronically signed by Lyn Moss MD on 1/22/2021 at 12:24 PM

## 2021-01-22 NOTE — PROGRESS NOTES
Patient arrived from OR to PACU # 17 s/p EXPLANTATION OF BILATERAL BREAST IMPLANTS, BILATERAL COMPLETE CAPSULECTOMIES (Bilateral ) per . Attached to PACU monitoring device, report received from CRNA who reported no problems intraoperatively. Patient sleepy but awakens easily to name/voice.

## 2021-01-22 NOTE — PROGRESS NOTES
Per Dr. Jodie Sparks, must wait for CXRAY report prior to discharge. This report has been finalized. Subcutaneous emphysema right and left lateral chest walls.       No pneumothorax.       No lobar consolidation     Pt may discharge per order when ready.

## 2021-01-22 NOTE — OP NOTE
[facility]     OPERATIVE DICTATION    NAME: Laure Sevilla   MRN: [id number]  DATE: 1/22/2021    AGE: 72 y.o. LOCATION: [address] DIAGNOSIS:  Ruptured breast implants     POSTOPERATIVE DIAGNOSIS:  Same. OPERATION PERFORMED: 1) Explantation of ruptured breast implants      2) Bilateral breast complete capsulectomies     SURGEON:  Brenda Rain MD    ASSISTANT: Jacklyn Ferrara (PGY1), Jeanne Lopez (SA)     ANESTHESIA: General     ESTIMATED BLOOD LOSS:  100 cc     DRAINS:  2 (1 15F drain on each side)     SPECIMENS: capsule, ruptured implants     OPERATIVE INDICATIONS:  This is a 72 y.o. female who presented to the office in consultation with ruptured breast implants that were placed over 30 years ago. She has had pain and with the rupture, desired to have her implants removed. A thorough discussion regarding the risks, benefits, alternatives, outcomes, and personnel involved was performed with the patient. After all questions were answered to the patient's satisfaction, they agreed to proceed. OPERATIVE PROCEDURE:  The patient was marked in the preoperative holding area and then brought to the operating room and placed in the supine position on the operating room table. She underwent general anesthesia without difficulty and was prepped and draped in the usual sterile manner. A time-out was performed confirming the patient and the procedure to be performed. The operation commenced by incising along the previous inframamary incision on the right. The subcutaneous tissue was dissected to the capsule, which was firm and fibrotic. Tedious dissection was performed to circumferentially free the capsule from the pectoralis major muscle as well as the subcutaneous tissue. There were areas of rupture with silicone extravasation that were also removed. The capsule and implant was removed noting a textured silicone implant. Both were sent to pathology to rule out MARII-ALCL. Hemostasis was obtained with electrocautery, and attention directed to the contralateral left breast where the same procedure was performed. The previous inframammary incision was opened and the subcutaneous tissue dissected to the capsule, which was also firm and fibrotic. The capsule was circumferentially freed from the surrounding tissue in a similar manner to the contralateral side. The entirety of the capsule was removed with some silicone granulomas also removed and sent to pathology. After hemostasis was also checked to be exemplary on the left, 15F drains were placed into each breast and secured in place using 3-0 Nylon sutures. The skin was then closed using 3-0 Monocryl sutures followed by a sterile dressing. The patient was then awakened and taken to the PACU in stable condition. There were no immediate complications and the patient tolerated the procedure well. At the end of the case, all counts were correct.     Mayco Galan MD

## 2021-01-22 NOTE — H&P
Naomi Gordillo    [id number]    [facility] Same Day Surgery Update H & P  Department of General Surgery   Surgical Service   Pre-operative History and Physical  Last H & P within the last 30 days. DIAGNOSIS:   Breast implant rupture, initial encounter [T85.43XA]    Procedure(s):  EXPLANTATION OF BILATERAL BREAST IMPLANTS, BILATERAL COMPLETE CAPSULECTOMIES     HISTORY OF PRESENT ILLNESS:   Patient with rupture of breast implant presents today for the above procedure. Covid 19:  Patient denies fever, chills, cough or known exposure to Covid-19. Past Medical History:        Diagnosis Date    History of hepatitis C      Past Surgical History:        Procedure Laterality Date    JOINT REPLACEMENT      both hips, last in Feb 2020.  KNEE SURGERY Right     meniscus repair     Past Social History:  Social History     Socioeconomic History    Marital status:      Spouse name: None    Number of children: None    Years of education: None    Highest education level: None   Occupational History    None   Social Needs    Financial resource strain: None    Food insecurity     Worry: None     Inability: None    Transportation needs     Medical: None     Non-medical: None   Tobacco Use    Smoking status: Never Smoker    Smokeless tobacco: Never Used   Substance and Sexual Activity    Alcohol use:  Yes     Alcohol/week: 1.0 standard drinks     Types: 1 Glasses of wine per week     Comment: occassionally     Drug use: No    Sexual activity: None   Lifestyle    Physical activity     Days per week: None     Minutes per session: None    Stress: None   Relationships    Social connections     Talks on phone: None     Gets together: None     Attends [employer] service: None     Active member of club or organization: None     Attends meetings of clubs or organizations: None     Relationship status: None    Intimate partner violence     Fear of current or ex partner: None Emotionally abused: None     Physically abused: None     Forced sexual activity: None   Other Topics Concern    None   Social History Narrative    None         Medications Prior to Admission:      Prior to Admission medications    Medication Sig Start Date End Date Taking? Authorizing Provider   Cholecalciferol (VITAMIN D3) 125 MCG (5000 UT) CHEW Take by mouth daily 2500 units   Yes Historical Provider, MD   triamcinolone (KENALOG) 0.1 % cream Apply to affected areas twice daily for up to 2 weeks or until improved for dermatitis. 8/21/19  Yes Devang Manriquez MD   clobetasol (TEMOVATE) 0.05 % cream APPLY TWO TIMES A DAY for 2 weeks or until improved. For dermatitis. 8/21/19  Yes Devang Manriquez MD   BIOTIN PO Take by mouth   Yes Historical Provider, MD         Allergies:  Sulfa antibiotics    PHYSICAL EXAM:      /75   Pulse 78   Temp 98 °F (36.7 °C) (Oral)   Resp 16   Ht 5' 9\" (1.753 m)   Wt 123 lb (55.8 kg)   SpO2 95%   BMI 18.16 kg/m²      Airway:  Airway patent with no audible stridor    Heart:  regular rate and rhythm, No murmur noted    Lungs:  No increased work of breathing, good air exchange, clear to auscultation bilaterally, no crackles or wheezing    Abdomen:  soft, non-distended, non-tender, no rebound tenderness or guarding, normal active bowel sounds and no masses palpated    ASSESSMENT AND PLAN     Patient is a 72 y.o. female with above specified procedure planned. 1.  Patient seen and focused exam done today- no new changes since last physical exam on 1/12/21    2. Access to ancillary services are available per request of the provider.     SALINAS Solano - JEFF     1/22/2021

## 2021-01-25 ENCOUNTER — TELEPHONE (OUTPATIENT)
Dept: SURGERY | Age: 71
End: 2021-01-25

## 2021-01-25 NOTE — TELEPHONE ENCOUNTER
I actually noticed that patient is already an established patient with Dr. Clem Villalba so I called to see if he could see her about the rash and they got the patient into see them tomorrow at 345. Patient informed of this appt.

## 2021-01-25 NOTE — TELEPHONE ENCOUNTER
Pt had surgery on 01/22/2021. She is having an allergic reaction to the tape, bright red rash that itches. What can she do? Please call.

## 2021-01-26 ENCOUNTER — OFFICE VISIT (OUTPATIENT)
Dept: DERMATOLOGY | Age: 71
End: 2021-01-26
Payer: MEDICARE

## 2021-01-26 VITALS — TEMPERATURE: 98.2 F

## 2021-01-26 DIAGNOSIS — L23.9 ALLERGIC CONTACT DERMATITIS, UNSPECIFIED TRIGGER: Primary | ICD-10-CM

## 2021-01-26 PROCEDURE — 99213 OFFICE O/P EST LOW 20 MIN: CPT | Performed by: DERMATOLOGY

## 2021-01-26 RX ORDER — PREDNISONE 10 MG/1
TABLET ORAL
Qty: 40 TABLET | Refills: 0 | Status: SHIPPED | OUTPATIENT
Start: 2021-01-26 | End: 2021-02-11

## 2021-01-26 NOTE — Clinical Note
Elisa Galan,    It looks like Carolynn Malik is dealing with some dermatitis on the chest that I favor to be allergic contact dermatitis possibly due to the adhesive in the drapes or tape. I went ahead and put her on a prednisone taper due to the severity. She also continue to apply topical steroids to the affected areas as well. Thank so much for having her see me!     Amy Mansfield

## 2021-01-26 NOTE — PROGRESS NOTES
[facility]  Alexsandra Victoria MD  [address] [facility]  1950    72 y.o. female     Date of Visit: 1/26/2021    Chief Complaint: rash    History of Present Illness:    She presents today for several day history of a worsening pruritic eruption on the chest wall that appeared about a day after having breast surgery. She reports using clobetasol cream with minimal improvement. She takes Benadryl in the evenings with some improvement. Review of Systems:  Gen: Feels well, good sense of health. Past Medical History, Family History, Surgical History, Medications and Allergies reviewed. Past Medical History:   Diagnosis Date    History of hepatitis C      Past Surgical History:   Procedure Laterality Date    BREAST ENHANCEMENT SURGERY Bilateral 1/22/2021    EXPLANTATION OF BILATERAL BREAST IMPLANTS, BILATERAL COMPLETE CAPSULECTOMIES performed by Judene Hodgkins, MD at [facility]      both hips, last in Feb 2020.  KNEE SURGERY Right     meniscus repair       Allergies   Allergen Reactions    Sulfa Antibiotics Rash     Outpatient Medications Marked as Taking for the 1/26/21 encounter (Office Visit) with Margaret Zafar MD   Medication Sig Dispense Refill    predniSONE (DELTASONE) 10 MG tablet Take 4 tablets by mouth daily for 4 days, THEN 3 tablets daily for 4 days, THEN 2 tablets daily for 4 days, THEN 1 tablet daily for 4 days. 40 tablet 0    oxyCODONE-acetaminophen (PERCOCET) 5-325 MG per tablet Take 1 tablet by mouth every 6 hours as needed for Pain for up to 7 days. Intended supply: 7 days. Take lowest dose possible to manage pain 28 tablet 0    Cholecalciferol (VITAMIN D3) 125 MCG (5000 UT) CHEW Take by mouth daily 2500 units      triamcinolone (KENALOG) 0.1 % cream Apply to affected areas twice daily for up to 2 weeks or until improved for dermatitis.  454 g 1  clobetasol (TEMOVATE) 0.05 % cream APPLY TWO TIMES A DAY for 2 weeks or until improved. For dermatitis. 60 g 2    BIOTIN PO Take by mouth           Physical Examination       Well appearing. 1.  Lower portion of chest/breasts surrounding surgical sites - brightly erythematous edematous focally scaly patches/plaques. Assessment and Plan     1. Allergic contact dermatitis, possibly due to adhesive drape/tape    Prednisone 40 mg daily for 4 days, then 30 mg daily for 4 days, then 20 mg daily for 4 days, then 10 mg daily for 4 days. Discussed risk of sleep disturbance, delayed wound healing. Clobetasol or triamcinolone cream twice daily until improved.           --Wing Jacinto MD

## 2021-02-01 ENCOUNTER — NURSE ONLY (OUTPATIENT)
Dept: SURGERY | Age: 71
End: 2021-02-01

## 2021-02-01 VITALS — WEIGHT: 127 LBS | TEMPERATURE: 97.4 F | BODY MASS INDEX: 18.75 KG/M2

## 2021-02-01 DIAGNOSIS — Z09 POSTOP CHECK: Primary | ICD-10-CM

## 2021-02-01 NOTE — PROGRESS NOTES
[facility]      PROCEDURE: Explantation of bilateral breast implants, bilateral complete capsulectomies  DATE: 1/22/21    Naomi Gordillo has been recovering well since her procedure. Pain has been well controlled with pain medication. GEN: NAD  BREAST:  Incisions healing well. No hematoma/seroma, drains seransang    PATHOLOGY: FINAL DIAGNOSIS:        A. Right breast, ruptured implant with capsule:      - Breast capsule with chronic and foreign body reaction, the overall        changes compatible with rupture/silicone exposure, with focal        classic calcification, and accompanying (artificial) implant; see        Comment.        B. Left breast, ruptured implant with capsule:      - Dense fibrous layer with classic calcification, compatible with        implant capsule, with accompanying (artificial) implant. COMMENT:  A: Though ALCL is not apparent on H&E, to more accurately   evaluated, a short panel of immunostains was requested, those to be   reported as an addendum.    FREDDY/FREDDY     IMP: 72 y. o.female s/p explantation of bilateral breast implants, bilateral capsulectomies  PLAN: Doing well overall. Bilateral drains removed. Will follow up in 1 month to ensure wound healing.         Naty Clark RN  [address] [place] [address] [address] Reconstructive Surgery  [phone]  02/01/21

## 2021-02-22 ENCOUNTER — OFFICE VISIT (OUTPATIENT)
Dept: SURGERY | Age: 71
End: 2021-02-22

## 2021-02-22 VITALS
TEMPERATURE: 97.3 F | BODY MASS INDEX: 18.43 KG/M2 | OXYGEN SATURATION: 98 % | DIASTOLIC BLOOD PRESSURE: 84 MMHG | RESPIRATION RATE: 15 BRPM | WEIGHT: 124.8 LBS | HEART RATE: 84 BPM | SYSTOLIC BLOOD PRESSURE: 116 MMHG

## 2021-02-22 DIAGNOSIS — Z09 POSTOP CHECK: Primary | ICD-10-CM

## 2021-02-22 PROCEDURE — 99024 POSTOP FOLLOW-UP VISIT: CPT | Performed by: SURGERY

## 2021-02-22 RX ORDER — DIPHENHYDRAMINE HCL 25 MG
25 CAPSULE ORAL EVERY 6 HOURS PRN
COMMUNITY

## 2021-02-22 NOTE — PROGRESS NOTES
[facility]    PROCEDURE: 1) Explantation of ruptured breast implants                             2) Bilateral breast complete capsulectomies  DATE: 1/22/21    Neel Briones has been recovering well since her procedure. Pain has been well controlled without pain medications. EXAM    /84   Pulse 84   Temp 97.3 °F (36.3 °C) (Temporal)   Resp 15   Wt 124 lb 12.8 oz (56.6 kg)   SpO2 98%   BMI 18.43 kg/m²     GEN: NAD   BREAST: Incisions well healed  Nipples viable  Deficiency of volume superiorly    IMP: 72 y. o.female s/p explantation of implants  PLAN: Doing well overall. She is happy with her results. Will follow-up in 6 months to determine if she would like to proceed with revision, secondary augmentation.     Nicolasa Montaño MD  [address] [place] [address] [address] Reconstructive Surgery  [phone]  02/22/21

## 2021-06-02 ENCOUNTER — OFFICE VISIT (OUTPATIENT)
Dept: DERMATOLOGY | Age: 71
End: 2021-06-02
Payer: MEDICARE

## 2021-06-02 VITALS — TEMPERATURE: 98.2 F

## 2021-06-02 DIAGNOSIS — L20.84 INTRINSIC ATOPIC DERMATITIS: Primary | ICD-10-CM

## 2021-06-02 DIAGNOSIS — L57.0 ACTINIC KERATOSIS: ICD-10-CM

## 2021-06-02 DIAGNOSIS — L81.4 SOLAR LENTIGO: ICD-10-CM

## 2021-06-02 DIAGNOSIS — L82.1 SK (SEBORRHEIC KERATOSIS): ICD-10-CM

## 2021-06-02 PROCEDURE — 17000 DESTRUCT PREMALG LESION: CPT | Performed by: DERMATOLOGY

## 2021-06-02 PROCEDURE — 17003 DESTRUCT PREMALG LES 2-14: CPT | Performed by: DERMATOLOGY

## 2021-06-02 PROCEDURE — 99213 OFFICE O/P EST LOW 20 MIN: CPT | Performed by: DERMATOLOGY

## 2021-06-02 NOTE — PROGRESS NOTES
[facility]  Jennifer David MD  [address] [place] [address]  1950    72 y.o. female     Date of Visit: 6/2/2021    Chief Complaint: atopic dermatitis, skin lesions    History of Present Illness:    1. She returns today to follow-up for history of atopic dermatitis. She reports great control with intermittent use of triamcinolone 0.1% cream or clobetasol cream.  Recurrences have been infrequent. The most recent recurrence has been on the lower extremities. 2.  She also complains of a recurrently scaly lesion on the left medial cheek. 3.  She complains of several growths on the lower neck and also on the left antecubital fossa. 4.  She also has multiple stable pigmented lesions on the face, chest and extremities. Review of Systems:  Gen: Feels well, good sense of health. Past Medical History, Family History, Surgical History, Medications and Allergies reviewed. Past Medical History:   Diagnosis Date    History of hepatitis C      Past Surgical History:   Procedure Laterality Date    BREAST ENHANCEMENT SURGERY Bilateral 1/22/2021    EXPLANTATION OF BILATERAL BREAST IMPLANTS, BILATERAL COMPLETE CAPSULECTOMIES performed by Harvey Miller MD at [facility] 15      both hips, last in Feb 2020.  KNEE SURGERY Right     meniscus repair       Allergies   Allergen Reactions    Sulfa Antibiotics Rash     Outpatient Medications Marked as Taking for the 6/2/21 encounter (Office Visit) with Lety Garcia MD   Medication Sig Dispense Refill    diphenhydrAMINE (BENADRYL) 25 MG capsule Take 25 mg by mouth every 6 hours as needed for Itching      Cholecalciferol (VITAMIN D3) 125 MCG (5000 UT) CHEW Take by mouth daily 2500 units      triamcinolone (KENALOG) 0.1 % cream Apply to affected areas twice daily for up to 2 weeks or until improved for dermatitis.  454 g 1    clobetasol (TEMOVATE) 0.05 % cream APPLY TWO TIMES A DAY for 2 weeks or until improved. For dermatitis. 60 g 2    BIOTIN PO Take by mouth           Physical Examination       The following were examined and determined to be normal: Psych/Neuro, Scalp/hair, Conjunctivae/eyelids, Gums/teeth/lips, Neck, Breast/axilla/chest, Abdomen, Back, RUE, LUE, RLE, LLE and Nails/digits. The following were examined and determined to be abnormal: Head/face. Well appearing. 1.  Clear. 2.  Nasal dorsum - 2, left medial cheek - 1: ill defined keratotic pink macules. 3.  Left antecubital fossa with a stuck on appearing verrucous tan papule. Lower neck and upper chest with several stuck on appearing verrucous skin colored to tan papules. 4.  Lateral portions of the face, chest and extremities with multiple scattered round smooth brown macules and patches. Assessment and Plan     1. Intrinsic atopic dermatitis -under great control    Triamcinolone 0.1% cream or clobetasol cream twice daily as needed for recurrences. 2. Actinic keratosis - few    2 cycles of liquid nitrogen applied to 3 AKs: Nasal dorsum - 2, left medial cheek - 1. Patient was educated regarding the potential risks of blister formation and discomfort. Wound care was discussed. 3. SK (seborrheic keratosis)     Reassurance. 4. Solar lentigines    Monitor for change. Encouraged sun protective behaviors including use of at least SPF 30 sunscreen. Return in about 1 year (around 6/2/2022).     --Hayden Amezcua MD

## 2021-08-18 ENCOUNTER — OFFICE VISIT (OUTPATIENT)
Dept: SURGERY | Age: 71
End: 2021-08-18
Payer: MEDICARE

## 2021-08-18 VITALS
HEART RATE: 74 BPM | DIASTOLIC BLOOD PRESSURE: 69 MMHG | BODY MASS INDEX: 18.43 KG/M2 | SYSTOLIC BLOOD PRESSURE: 111 MMHG | OXYGEN SATURATION: 98 % | WEIGHT: 124.8 LBS

## 2021-08-18 DIAGNOSIS — Z09 POSTOP CHECK: Primary | ICD-10-CM

## 2021-08-18 DIAGNOSIS — N64.82 MICROMASTIA: ICD-10-CM

## 2021-08-18 PROCEDURE — 99213 OFFICE O/P EST LOW 20 MIN: CPT | Performed by: SURGERY

## 2021-08-18 NOTE — PROGRESS NOTES
[facility]    PROCEDURE: 1) Explantation of ruptured breast implants                             2) Bilateral breast complete capsulectomies  DATE: 1/22/21    Lamonte Newman has been recovering well since her procedure. Pain has been well controlled without pain medications. PMHx:   Past Medical History:   Diagnosis Date    History of hepatitis C      PSHx:   Past Surgical History:   Procedure Laterality Date    BREAST ENHANCEMENT SURGERY Bilateral 1/22/2021    EXPLANTATION OF BILATERAL BREAST IMPLANTS, BILATERAL COMPLETE CAPSULECTOMIES performed by Shane Zee MD at [facility]      both hips, last in Feb 2020.  KNEE SURGERY Right     meniscus repair     Allergy:   Allergies   Allergen Reactions    Sulfa Antibiotics Rash     SHx:   Social History     Socioeconomic History    Marital status:      Spouse name: Not on file    Number of children: Not on file    Years of education: Not on file    Highest education level: Not on file   Occupational History    Not on file   Tobacco Use    Smoking status: Never Smoker    Smokeless tobacco: Never Used   Vaping Use    Vaping Use: Never used   Substance and Sexual Activity    Alcohol use: Yes     Alcohol/week: 1.0 standard drinks     Types: 1 Glasses of wine per week     Comment: occassionally     Drug use: No    Sexual activity: Not on file   Other Topics Concern    Not on file   Social History Narrative    Not on file     Social Determinants of Health     Financial Resource Strain:     Difficulty of Paying Living Expenses:    Food Insecurity:     Worried About Running Out of Food in the Last Year:     [address] in the Last Year:    Transportation Needs:     Lack of Transportation (Medical):      Lack of Transportation (Non-Medical):    Physical Activity:     Days of Exercise per Week:     Minutes of Exercise per Session:    Stress:     Feeling of Stress :    Social Connections:     Frequency of Communication with Friends and Family:     Frequency of Social Gatherings with Friends and Family:     Attends [employer] Services:     Active Member of Clubs or Organizations:     Attends Club or Organization Meetings:     Marital Status:    Intimate Partner Violence:     Fear of Current or Ex-Partner:     Emotionally Abused:     Physically Abused:     Sexually Abused:      FHx: Family history reviewed and is noncontributory (no breast CA)    Meds:   Current Outpatient Medications   Medication Sig Dispense Refill    diphenhydrAMINE (BENADRYL) 25 MG capsule Take 25 mg by mouth every 6 hours as needed for Itching      Cholecalciferol (VITAMIN D3) 125 MCG (5000 UT) CHEW Take by mouth daily 2500 units      triamcinolone (KENALOG) 0.1 % cream Apply to affected areas twice daily for up to 2 weeks or until improved for dermatitis. 454 g 1    clobetasol (TEMOVATE) 0.05 % cream APPLY TWO TIMES A DAY for 2 weeks or until improved. For dermatitis. 60 g 2    BIOTIN PO Take by mouth       No current facility-administered medications for this visit. ROS   Constitutional: Negative for chills and fever. HENT: Negative for congestion, facial swelling, and voice change. Eyes: Negative for photophobia and visual disturbance. Respiratory: Negative for apnea, cough, chest tightness and shortness of breath. Cardiovascular: Negative for chest pain and palpitations. Gastrointestinal: Negative for dysphagia and early satiety. Genitourinary: Negative for difficulty urinating, dysuria, flank pain, frequency and hematuria. Musculoskeletal: Negative for new gait problem, joint swelling and myalgias. Skin: Negative for color change, pallor and rash. Endocrine: negative for tremors, temperature intolerance or polydipsia. Allergic/Immunologic: Negative for new environmental or food allergies. Neurological: Negative for dizziness, seizures, speech difficulty, numbness.    Hematological: Negative for adenopathy. Psychiatric/Behavioral: Negative for agitation and confusion. EXAM    /69   Pulse 74   Wt 124 lb 12.8 oz (56.6 kg)   SpO2 98%   BMI 18.43 kg/m²     GEN: NAD, pleasant, healthy  CVS: RRR  PULM: No respiratory distress  HEENT: PERRLA/EOMI; hearing appears within normal limits  NECK: Supple with trachea in midline, no masses  FACE:Wearing mask  EXT: No lymphedema  BREAST: Left larger than Right   R  Ptosis ndgndrndanddndend:nd nd2nd Palpable masses: No     Nipple retraction: No     Palpable axillary lymphadenopathy: No     SN-N: 20 cm     N-IMF: 5 cm     Breast width: 12.9 cm     Healed IMF incision      L  Ptosis ndgndrndanddndend:nd nd2nd Palpable masses: No     Nipple retraction: No     Palpable axillary lymphadenopathy: No     SN-N: 21 cm     N-IMF: 5 cm     Breast width: 13.3 cm     Healed IMF incision  ABD: soft/NT/ND  NEURO: No focal deficits, no obvious CN deficits    IMP: 72 y. o.female s/p explantation of ruptured implants  PLAN: Would benefit from secondary breast augmentation. Will need to ensure mammogram timing up to date and will follow-up with [facility] for sizing. Will then schedule. R/B/A/O/P discussed in detail with the patient who agrees to proceed.      Renea Kruse MD  [address] [place] [address] [address] Reconstructive Surgery  [phone]  08/18/21

## 2022-06-08 ENCOUNTER — TELEPHONE (OUTPATIENT)
Dept: DERMATOLOGY | Age: 72
End: 2022-06-08

## 2022-06-08 NOTE — LETTER
[facility]  [address] [facility]. [address] [place] [address] [address]  Phone: [phone]  Fax: [phone]    Betsy Irene MD        June 8, 2022     [address] Joby Garcia [address] [place] [id number]      Dear [employer]: We missed seeing you for a scheduled appointment at [place] [address] with Betsy Irene MD on 6/8/2022. We're sorry you were unable to keep your appointment and hope that you are doing well. We ask that you please call 24 hours in advance if you are unable to make your appointment, so that we can give that time to another patient in need. We care about you and the management of your healthcare and want to make sure that you follow up as recommended. To provide quality care and timely appointments to all our patients, you may be dismissed from the practice if you do not show for three (3) scheduled appointments within a 12-month period. We would like to continue treating your healthcare needs. Please call the office to reschedule your appointment, if needed.      Sincerely,  [facility]) Dermatology

## 2022-10-31 ENCOUNTER — OFFICE VISIT (OUTPATIENT)
Dept: DERMATOLOGY | Age: 72
End: 2022-10-31
Payer: MEDICARE

## 2022-10-31 DIAGNOSIS — L57.0 ACTINIC KERATOSIS: ICD-10-CM

## 2022-10-31 DIAGNOSIS — L20.84 INTRINSIC ATOPIC DERMATITIS: Primary | ICD-10-CM

## 2022-10-31 DIAGNOSIS — L82.0 INFLAMED SEBORRHEIC KERATOSIS: ICD-10-CM

## 2022-10-31 DIAGNOSIS — L81.4 SOLAR LENTIGO: ICD-10-CM

## 2022-10-31 DIAGNOSIS — L82.1 SK (SEBORRHEIC KERATOSIS): ICD-10-CM

## 2022-10-31 PROCEDURE — 1123F ACP DISCUSS/DSCN MKR DOCD: CPT | Performed by: DERMATOLOGY

## 2022-10-31 PROCEDURE — 17110 DESTRUCTION B9 LES UP TO 14: CPT | Performed by: DERMATOLOGY

## 2022-10-31 PROCEDURE — 99213 OFFICE O/P EST LOW 20 MIN: CPT | Performed by: DERMATOLOGY

## 2022-10-31 PROCEDURE — 17000 DESTRUCT PREMALG LESION: CPT | Performed by: DERMATOLOGY

## 2022-10-31 RX ORDER — TRIAMCINOLONE ACETONIDE 1 MG/G
CREAM TOPICAL
Qty: 454 G | Refills: 1 | Status: SHIPPED | OUTPATIENT
Start: 2022-10-31

## 2022-10-31 NOTE — PROGRESS NOTES
[facility] Dermatology  Sherry Sosa MD  [phone]      Bolivar Stanford  1950    71 y.o. female     Date of Visit: 10/31/2022    Chief Complaint: dermatitis    History of Present Illness:    1. She returns today to follow-up for history of atopic dermatitis. She reports good control of her disease right now. Worse in the winter months. Doing well with intermittent use of triamcinolone 0.1% cream.      Has also used clobetasol cream in the past.     2.  She also has a persistent pink lesion on the right forehead. 3.  She reports a persistent itchy lesion on the back of the right shoulder. 4.  She reports several asymptomatic growths on the upper portion of the trunk. 5.  She has stable asymptomatic pigmented lesions on the chest and extremities. Review of Systems:  Gen: Feels well, good sense of health. Skin: No new or changing moles. Past Medical History, Family History, Surgical History, Medications and Allergies reviewed. Past Medical History:   Diagnosis Date    History of hepatitis C      Past Surgical History:   Procedure Laterality Date    BREAST ENHANCEMENT SURGERY Bilateral 1/22/2021    EXPLANTATION OF BILATERAL BREAST IMPLANTS, BILATERAL COMPLETE CAPSULECTOMIES performed by Hamida Gutierrez MD at [facility]. [address]      both hips, last in Feb 2020. KNEE SURGERY Right     meniscus repair       Allergies   Allergen Reactions    Sulfa Antibiotics Rash     Outpatient Medications Marked as Taking for the 10/31/22 encounter (Office Visit) with Rima Braun MD   Medication Sig Dispense Refill    tretinoin (RETIN-A) 0.025 % cream Apply a pea sized amount to the face QHS 20 g 2    triamcinolone (KENALOG) 0.1 % cream Apply to affected areas twice daily for up to 2 weeks or until improved for dermatitis.  454 g 1    diphenhydrAMINE (BENADRYL) 25 MG capsule Take 25 mg by mouth every 6 hours as needed for Itching      Cholecalciferol (VITAMIN D3) 125 MCG (5000 UT) CHEW Take by mouth daily 2500 units      clobetasol (TEMOVATE) 0.05 % cream APPLY TWO TIMES A DAY for 2 weeks or until improved. For dermatitis. 60 g 2    BIOTIN PO Take by mouth         Physical Examination       The following were examined and determined to be normal: Psych/Neuro, Scalp/hair, Conjunctivae/eyelids, Gums/teeth/lips, Neck, Breast/axilla/chest, Abdomen, Back, LUE, LLE, and Nails/digits. The following were examined and determined to be abnormal: Head/face, RUE, and RLE. Well appearing. 1.  Lower posterior portion of the right leg - ill defined scaly pink patch. 2.  Right forehead - 1 scaly pink macule. 3.  Right posterior shoulder - stuck on appearing verrucous erythematous papule. 4.  Anterior trunk with several stuck on appearing verrucous skin colored papules. 5.  Chest and extremities with scattered round smooth brown macules and patches. Assessment and Plan     1. Intrinsic atopic dermatitis - minimal disease activity today    Continue use of triamcinolone 0.1% cream twice daily as need for recurrences. 2. Actinic keratosis - 1    Cryotherapy was discussed and patient agreed to proceed. Consent was obtained. 1 lesions were treated cryotherapy: right forehead. 2 cycles of liquid nitrogen applied to each lesion for 5 seconds using a Cry-Ac cryo spray gun. Patient was educated regarding the potential risks of blister formation and discomfort. Wound care was discussed. The patient tolerated the procedure well and there were no immediate complications. 3. Inflamed seborrheic keratosis     Cryotherapy was discussed and patient agreed to proceed. Consent was obtained. 1 lesions were treated cryotherapy: right posterior shoulder. 2 cycles of liquid nitrogen applied to each lesion for 5 seconds using a Cry-Ac cryo spray gun. Patient was educated regarding the potential risks of blister formation and discomfort. Wound care was discussed.   The patient tolerated the procedure well and there were no immediate complications. 4. SK (seborrheic keratosis) - several    Reassurance. 5. Solar lentigines    Monitor for change. Sun protective behaviors encouraged including use of at least SPF 30 or more sunscreen. She requested tretinoin cream for facial aging. Rx sent. Return in about 1 year (around 10/31/2023).     --Marty Greco MD

## 2023-05-18 NOTE — PROGRESS NOTES
[facility] / [facility] ([facility]) [address] [facility] [facility]    Acknowledgment of Informed Consent for Surgical or Medical Procedure and Sedation  I agree to allow doctor(s) Laura Dudley and his/her associates or assistants, including residents and/or other qualified medical practitioner to perform the following medical treatment or procedure and to administer or direct the administration of sedation as necessary:  Procedure(s): [address] [place] [address]  My doctor has explained the following regarding the proposed procedure:  ? the explanation of the procedure  ? the benefits of the procedure  ? the potential problems that might occur during recuperation  ? the risks and side effects of the procedure which could include but are not limited to severe blood loss, infection, stroke or death  ? the benefits, risks and side effect of alternative procedures including the consequences of declining this procedure or any alternative procedures  ? the likelihood of achieving satisfactory results. I acknowledge no guarantee or assurance has been made to me regarding the results. I understand that during the course of this treatment/procedure, unforeseen conditions can occur which require an additional or different procedure. I agree to allow my physician or assistants to perform such extension of the original procedure as they may find necessary. I understand that sedation will often result in temporary impairment of memory and fine motor skills and that sedation can occasionally progress to a state of deep sedation or general anesthesia. I understand the risks of anesthesia for surgery include, but are not limited to, sore throat, hoarseness, injury to face, mouth, or teeth; nausea; headache; injury to blood vessels or nerves; death, brain damage, or paralysis. I understand that if I have a Limitation of Treatment order in effect during my hospitalization, the order may or may not be in effect during this procedure. I give my doctor permission to give me blood or blood products. I understand that there are risks with receiving blood such as hepatitis, AIDS, fever, or allergic reaction. I acknowledge that the risks, benefits, and alternatives of this treatment have been explained to me and that no express or implied warranty has been given by the hospital, any blood bank, or any person or entity as to the blood or blood components transfused. At the discretion of my doctor, I agree to allow observers, equipment/product representatives and allow photographing, and/or televising of the procedure, provided my name or identity is maintained confidentially. I agree the hospital may dispose of or use for scientific or educational purposes any tissue, fluid, or body parts which may be removed.     ________________________________Date________Time______ am/pm  ([place] [place] [facility])  Patient or Signature of Closest Relative or Legal Guardian    ________________________________Date________Time______am/pm      Page 1 of  1  Witness Elevated troponin Elevated troponin Medication management Medication management Medication management

## 2023-07-20 ENCOUNTER — TELEPHONE (OUTPATIENT)
Dept: DERMATOLOGY | Age: 73
End: 2023-07-20

## 2023-07-20 RX ORDER — TRETINOIN 0.5 MG/G
CREAM TOPICAL
Qty: 20 G | Refills: 2 | Status: SHIPPED | OUTPATIENT
Start: 2023-07-20 | End: 2023-08-19

## 2023-07-20 NOTE — TELEPHONE ENCOUNTER
Pt requesting a refill on RX tretinoin (RETIN-A) 0.025 % cream     But wants a stronger dosage 0.050% instead   Thanks   Please advise

## 2023-10-31 ENCOUNTER — OFFICE VISIT (OUTPATIENT)
Dept: DERMATOLOGY | Age: 73
End: 2023-10-31
Payer: MEDICARE

## 2023-10-31 DIAGNOSIS — L72.0 MILIUM: ICD-10-CM

## 2023-10-31 DIAGNOSIS — L20.84 INTRINSIC ATOPIC DERMATITIS: Primary | ICD-10-CM

## 2023-10-31 DIAGNOSIS — L21.9 SEBORRHEIC DERMATITIS: ICD-10-CM

## 2023-10-31 DIAGNOSIS — L57.0 ACTINIC KERATOSIS: ICD-10-CM

## 2023-10-31 DIAGNOSIS — L82.1 SK (SEBORRHEIC KERATOSIS): ICD-10-CM

## 2023-10-31 PROCEDURE — 99213 OFFICE O/P EST LOW 20 MIN: CPT | Performed by: DERMATOLOGY

## 2023-10-31 PROCEDURE — 17000 DESTRUCT PREMALG LESION: CPT | Performed by: DERMATOLOGY

## 2023-10-31 PROCEDURE — 1123F ACP DISCUSS/DSCN MKR DOCD: CPT | Performed by: DERMATOLOGY

## 2023-10-31 RX ORDER — FLUOCINONIDE TOPICAL SOLUTION USP, 0.05% 0.5 MG/ML
SOLUTION TOPICAL
Qty: 60 ML | Refills: 2 | Status: SHIPPED | OUTPATIENT
Start: 2023-10-31

## 2023-10-31 RX ORDER — ALENDRONATE SODIUM 70 MG/1
70 TABLET ORAL
COMMUNITY
Start: 2023-03-06

## 2023-10-31 RX ORDER — CALCIUM CARBONATE 500(1250)
500 TABLET ORAL DAILY
COMMUNITY

## 2023-10-31 NOTE — PROGRESS NOTES
[facility]ermat[facility]  Hans Luu MD  [address]  1950    68 y.o. female     Date of Visit: 10/31/2023    Chief Complaint: atopic dermatitis, skin lesions    History of Present Illness:    1. She returns for a history of atopic dermatitis - still comes and goes but improves with triamcinolone 0.1% cream.     2.  She reports a persistent scaly lesion on the right hand. 3.  She has multiple asymptomatic growths on the back. 4.  She reports a couple of persistent white lesions on the central face. 5.  She complains of some itching in the scalp. Review of Systems:  Gen: Feels well, good sense of health. Past Medical History, Family History, Surgical History, Medications and Allergies reviewed. Past Medical History:   Diagnosis Date    History of hepatitis C      Past Surgical History:   Procedure Laterality Date    BREAST ENHANCEMENT SURGERY Bilateral 1/22/2021    EXPLANTATION OF BILATERAL BREAST IMPLANTS, BILATERAL COMPLETE CAPSULECTOMIES performed by Lake Cotton MD at [address]      both hips, last in Feb 2020. KNEE SURGERY Right     meniscus repair       Allergies   Allergen Reactions    Sulfa Antibiotics Rash     Outpatient Medications Marked as Taking for the 10/31/23 encounter (Office Visit) with Jitendra Lake MD   Medication Sig Dispense Refill    alendronate (FOSAMAX) 70 MG tablet Take 1 tablet by mouth every 7 days      calcium carbonate (OSCAL) 500 MG TABS tablet Take 1 tablet by mouth daily      fluocinonide (LIDEX) 0.05 % external solution Apply sparingly to scalp daily as needed for rash. 60 mL 2    triamcinolone (KENALOG) 0.1 % cream Apply to affected areas twice daily for up to 2 weeks or until improved for dermatitis.  454 g 1    diphenhydrAMINE (BENADRYL) 25 MG capsule Take 1 capsule by mouth every 6 hours as needed for Itching      clobetasol (TEMOVATE) 0.05 % cream APPLY TWO TIMES A DAY for 2 weeks or

## 2024-09-09 ENCOUNTER — HOSPITAL ENCOUNTER (OUTPATIENT)
Age: 74
Discharge: HOME | End: 2024-09-09
Payer: MEDICARE

## 2024-09-09 ENCOUNTER — HOSPITAL ENCOUNTER (OUTPATIENT)
Dept: GENERAL RADIOLOGY | Age: 74
Discharge: HOME | End: 2024-09-09
Payer: MEDICARE

## 2024-09-09 PROCEDURE — 71046 X-RAY EXAM CHEST 2 VIEWS: CPT

## 2024-10-31 ENCOUNTER — OFFICE VISIT (OUTPATIENT)
Dept: DERMATOLOGY | Age: 74
End: 2024-10-31

## 2024-10-31 DIAGNOSIS — L20.84 INTRINSIC ATOPIC DERMATITIS: Primary | ICD-10-CM

## 2024-10-31 DIAGNOSIS — L81.4 SOLAR LENTIGINOSIS: ICD-10-CM

## 2024-10-31 DIAGNOSIS — L57.0 ACTINIC KERATOSIS: ICD-10-CM

## 2024-10-31 DIAGNOSIS — L82.1 SK (SEBORRHEIC KERATOSIS): ICD-10-CM

## 2024-10-31 RX ORDER — TRETINOIN 0.5 MG/G
CREAM TOPICAL
Qty: 20 G | Refills: 4 | Status: SHIPPED | OUTPATIENT
Start: 2024-10-31

## 2024-10-31 NOTE — PROGRESS NOTES
mouth every 6 hours as needed for Itching      Cholecalciferol (VITAMIN D3) 125 MCG (5000 UT) CHEW Take by mouth daily 2500 units      clobetasol (TEMOVATE) 0.05 % cream APPLY TWO TIMES A DAY for 2 weeks or until improved.  For dermatitis. 60 g 2    BIOTIN PO Take by mouth           Physical Examination       Well appearing.    1.  Lower back/sacral region with mild erythema and scaling.     2.  Lower neck, right lateral cheek with few stuck on appearing verrucous tan papules.     3.  Nasal dorsum with 3 keratotic pink macules.     4.  Upper back, extremities with multiple round smooth light brown macules/patches.         Assessment and Plan     1. Intrinsic atopic dermatitis - mild, well-controlled    Continue triamcinolone 0.1% cream twice daily as needed.      2. SK (seborrheic keratosis)     Reassurance.      3. Actinic keratosis - 3    Cryotherapy was discussed and patient agreed to proceed.  Consent was obtained.  3 lesions were treated cryotherapy: nasal dorsum. 2 cycles of liquid nitrogen applied to each lesion for 5 seconds using a Cry-Ac cryo spray gun.  Patient was educated regarding the potential risks of blister formation and discomfort.  Wound care was discussed.  The patient tolerated the procedure well and there were no immediate complications.      4. Solar lentiginosis     Monitor for change.  Sun protective behaviors encouraged including use of at least SPF 30 or more sunscreen.          Return in about 1 year (around 10/31/2025).    --Miguel Hamlin MD

## 2025-03-24 ENCOUNTER — TRANSCRIBE ORDERS (OUTPATIENT)
Dept: ADMINISTRATIVE | Age: 75
End: 2025-03-24

## 2025-03-24 DIAGNOSIS — Z00.00 ENCOUNTER FOR GENERAL ADULT MEDICAL EXAMINATION WITHOUT ABNORMAL FINDINGS: ICD-10-CM

## 2025-03-24 DIAGNOSIS — M81.0 AGE-RELATED OSTEOPOROSIS WITHOUT CURRENT PATHOLOGICAL FRACTURE: Primary | ICD-10-CM

## 2025-03-28 ENCOUNTER — HOSPITAL ENCOUNTER (OUTPATIENT)
Dept: WOMENS IMAGING | Age: 75
Discharge: HOME | End: 2025-03-28
Payer: MEDICARE

## 2025-03-28 VITALS — HEIGHT: 69 IN | BODY MASS INDEX: 18.81 KG/M2 | WEIGHT: 127 LBS

## 2025-03-28 DIAGNOSIS — Z12.31 BREAST CANCER SCREENING BY MAMMOGRAM: ICD-10-CM

## 2025-03-28 PROCEDURE — 77063 BREAST TOMOSYNTHESIS BI: CPT

## 2025-04-02 ENCOUNTER — APPOINTMENT (OUTPATIENT)
Dept: WOMENS IMAGING | Age: 75
End: 2025-04-02
Payer: MEDICARE

## 2025-04-02 ENCOUNTER — HOSPITAL ENCOUNTER (OUTPATIENT)
Dept: WOMENS IMAGING | Age: 75
Discharge: HOME | End: 2025-04-02
Payer: MEDICARE

## 2025-04-02 DIAGNOSIS — Z00.00 ENCOUNTER FOR GENERAL ADULT MEDICAL EXAMINATION WITHOUT ABNORMAL FINDINGS: ICD-10-CM

## 2025-04-02 DIAGNOSIS — M81.0 AGE-RELATED OSTEOPOROSIS WITHOUT CURRENT PATHOLOGICAL FRACTURE: ICD-10-CM

## 2025-04-02 PROCEDURE — 77080 DXA BONE DENSITY AXIAL: CPT

## (undated) DEVICE — STAPLER SKIN H3.9MM WIRE DIA0.58MM CRWN 6.9MM 35 STPL ROT

## (undated) DEVICE — E-Z CLEAN, NON-STICK, PTFE COATED, ELECTROSURGICAL BLADE ELECTRODE, MODIFIED EXTENDED INSULATION, 2.5 INCH (6.35 CM): Brand: MEGADYNE

## (undated) DEVICE — SOLUTION IV 1000ML 0.9% SOD CHL

## (undated) DEVICE — SUTURE ETHLN SZ 3-0 L18IN NONABSORBABLE BLK FS-1 L24MM 3/8 663H

## (undated) DEVICE — GLOVE ORANGE PI 7 1/2   MSG9075

## (undated) DEVICE — SYRINGE IRRIG 60ML SFT PLIABLE BLB EZ TO GRP 1 HND USE W/

## (undated) DEVICE — 3M™ IOBAN™ 2 ANTIMICROBIAL INCISE DRAPE 6640EZ: Brand: IOBAN™ 2

## (undated) DEVICE — COVER LT HNDL BLU PLAS

## (undated) DEVICE — CHLORAPREP 26ML ORANGE

## (undated) DEVICE — GARMENT,MEDLINE,DVT,INT,CALF,MED, GEN2: Brand: MEDLINE

## (undated) DEVICE — ELECTROSURGICAL PENCIL ROCKER SWITCH NON COATED BLADE ELECTRODE 10 FT (3 M) CORD HOLSTER: Brand: MEGADYNE

## (undated) DEVICE — INTENDED USE FOR SURGICAL MARKING ON INTACT SKIN, ALSO PROVIDES A PERMANENT METHOD OF IDENTIFYING OBJECTS IN THE OPERATING ROOM: Brand: WRITESITE® PLUS MINI PREP RESISTANT MARKER

## (undated) DEVICE — JEWISH HOSPITAL TURNOVER KIT: Brand: MEDLINE INDUSTRIES, INC.

## (undated) DEVICE — SURGICAL SET UP - SURE SET: Brand: MEDLINE INDUSTRIES, INC.

## (undated) DEVICE — PLATE ES AD W 9FT CRD 2

## (undated) DEVICE — SUTURE MCRYL SZ 3-0 L27IN ABSRB UD L19MM PS-2 3/8 CIR PRIM Y427H

## (undated) DEVICE — SPONGE,LAP,18"X18",DLX,XR,ST,5/PK,40/PK: Brand: MEDLINE

## (undated) DEVICE — INTENDED FOR TISSUE SEPARATION, AND OTHER PROCEDURES THAT REQUIRE A SHARP SURGICAL BLADE TO PUNCTURE OR CUT.: Brand: BARD-PARKER ® CARBON RIB-BACK BLADES

## (undated) DEVICE — PACK,UNIVERSAL,NO GOWNS: Brand: MEDLINE

## (undated) DEVICE — SHEET,DRAPE,40X58,STERILE: Brand: MEDLINE

## (undated) DEVICE — DRAPE IRRIG FLD WRM W44XL44IN W/ AORN STD PRTBL INTRATEMP

## (undated) DEVICE — PAD FOAM 11.75X7-7/8 IN RESTON LF

## (undated) DEVICE — GLOVE ORANGE PI 8 1/2   MSG9085

## (undated) DEVICE — YANKAUER,OPEN TIP,W/O VENT,STERILE: Brand: MEDLINE INDUSTRIES, INC.

## (undated) DEVICE — AGENT HEMSTAT 3GM OXIDIZED REGENERATED CELOS ABSRB FOR CONT (ORDER MULTIPLES OF 5EA)

## (undated) DEVICE — SURE SET-DOUBLE BASIN-LF: Brand: MEDLINE INDUSTRIES, INC.